# Patient Record
Sex: MALE | Race: WHITE | NOT HISPANIC OR LATINO | Employment: FULL TIME | URBAN - METROPOLITAN AREA
[De-identification: names, ages, dates, MRNs, and addresses within clinical notes are randomized per-mention and may not be internally consistent; named-entity substitution may affect disease eponyms.]

---

## 2017-01-06 ENCOUNTER — TELEPHONE (OUTPATIENT)
Dept: FAMILY MEDICINE | Facility: CLINIC | Age: 37
End: 2017-01-06

## 2017-01-06 DIAGNOSIS — Z20.818 EXPOSURE TO PERTUSSIS: Primary | ICD-10-CM

## 2017-01-06 RX ORDER — AZITHROMYCIN 250 MG/1
TABLET, FILM COATED ORAL
Qty: 6 TABLET | Refills: 0 | Status: SHIPPED | OUTPATIENT
Start: 2017-01-06 | End: 2017-01-12

## 2017-01-11 ENCOUNTER — TELEPHONE (OUTPATIENT)
Dept: FAMILY MEDICINE | Facility: CLINIC | Age: 37
End: 2017-01-11

## 2017-01-11 DIAGNOSIS — Z30.09 ENCOUNTER FOR VASECTOMY COUNSELING: Primary | ICD-10-CM

## 2017-01-11 RX ORDER — DIAZEPAM 5 MG
TABLET ORAL
Qty: 3 TABLET | Refills: 0 | Status: SHIPPED | OUTPATIENT
Start: 2017-01-11 | End: 2017-12-10

## 2017-01-12 ENCOUNTER — TELEPHONE (OUTPATIENT)
Dept: FAMILY MEDICINE | Facility: CLINIC | Age: 37
End: 2017-01-12

## 2017-01-12 ENCOUNTER — OFFICE VISIT (OUTPATIENT)
Dept: FAMILY MEDICINE | Facility: CLINIC | Age: 37
End: 2017-01-12
Payer: COMMERCIAL

## 2017-01-12 VITALS
RESPIRATION RATE: 16 BRPM | BODY MASS INDEX: 19.92 KG/M2 | HEART RATE: 100 BPM | SYSTOLIC BLOOD PRESSURE: 122 MMHG | TEMPERATURE: 98.5 F | OXYGEN SATURATION: 98 % | HEIGHT: 72 IN | DIASTOLIC BLOOD PRESSURE: 80 MMHG | WEIGHT: 147.1 LBS

## 2017-01-12 DIAGNOSIS — Z30.2 ENCOUNTER FOR STERILIZATION: Primary | ICD-10-CM

## 2017-01-12 PROCEDURE — 88302 TISSUE EXAM BY PATHOLOGIST: CPT | Mod: 26 | Performed by: FAMILY MEDICINE

## 2017-01-12 PROCEDURE — 88302 TISSUE EXAM BY PATHOLOGIST: CPT | Performed by: FAMILY MEDICINE

## 2017-01-12 PROCEDURE — 55250 REMOVAL OF SPERM DUCT(S): CPT | Performed by: FAMILY MEDICINE

## 2017-01-12 RX ORDER — HYDROCODONE BITARTRATE AND ACETAMINOPHEN 5; 325 MG/1; MG/1
1 TABLET ORAL EVERY 8 HOURS PRN
Qty: 12 TABLET | Refills: 0 | Status: SHIPPED | OUTPATIENT
Start: 2017-01-12 | End: 2017-12-10

## 2017-01-12 ASSESSMENT — PAIN SCALES - GENERAL: PAINLEVEL: NO PAIN (0)

## 2017-01-12 NOTE — TELEPHONE ENCOUNTER
Patient is calling stating that [facility] hasn't received his script. Wondering if he can get it filled here and pick it up from to his procedure? Please call him back

## 2017-01-12 NOTE — TELEPHONE ENCOUNTER
Written prescription was faxed to [facility] pharmacy in [place].  Jacki Wu CMA

## 2017-01-12 NOTE — PROGRESS NOTES
SUBJECTIVE:                                                    Barron Angel is a 36 year old male who presents to clinic today for the following health issues:    Concern - Procedure     Onset: Vasectomy     Barron is here for the vasectomy procedure.  Pre-procedure consultation was done last month and please see my dictation for further details. Information about vasectomy was given and he has no concern or questions about the procedure today. Again per patient, both he and his wife are ready to have this procedure done and they are for sure that they do want to have any more children in the future. They have 6 children together.  He denies of URI symptoms including runny nose, nasal congestions or coughing. No abdominal pain, nausea or vomiting. No diarrhea or constipation. No problems with urination.        Problem list and histories reviewed & adjusted, as indicated.  Additional history: as documented          ROS:  Constitutional, HEENT, cardiovascular, pulmonary, gi and gu systems are negative, except as otherwise noted.      OBJECTIVE:                                                      Vitals: /80 mmHg  Pulse 100  Temp(Src) 98.5  F (36.9  C) (Temporal)  Resp 16  Ht 6' (1.829 m)  Wt 147 lb 1.6 oz (66.724 kg)  BMI 19.95 kg/m2  SpO2 98%      GENERAL: healthy, alert and no distress   (male): normal male genitalia without lesions or urethral discharge, no hernia.  Testes are descended bilaterally with no testicular mass. Scrotal skin looks normal. The vas deferens were easily by palpated bilaterally.    Diagnostic Test Results:  none         ASSESSMENT/PLAN:                                                      1. Encounter for vasectomy  Patient was again reassured that vasectomy is considered a permenant and irrevirsible birth control method.  Per patient, both he and his wife are very sure that this is what they want.  No contraindication was noted. Will perform the vasectomy and please  see the procedure note for further details.  Post procedure care and wound care discussed. He was educated about symptoms to need to be seen or call in.  He was again informed that he is not declared to be sterile until he has two negative sperm test consecutively and therefore he should continue with the current form of birth control.  Sperm tests to be conducted in 4 weeks times 2.  Tylenol or Motrin as needed for pain and Vicodin as needed for severe pain. All of his questions were answered.      ICD-10-CM    1. Encounter for sterilization Z30.2 SURGICAL PATHOLOGY EXAM     Semen Post Vasectomy Qual (MG, [place], [place])     HYDROcodone-acetaminophen (NORCO) 5-325 MG per tablet       Laisha Bryan MD  [facility] [place]

## 2017-01-12 NOTE — NURSING NOTE
Chief Complaint   Patient presents with     Procedure     vasectomy       Initial /80 mmHg  Pulse 100  Temp(Src) 98.5  F (36.9  C) (Temporal)  Resp 16  Ht 6' (1.829 m)  Wt 147 lb 1.6 oz (66.724 kg)  BMI 19.95 kg/m2  SpO2 98% Estimated body mass index is 19.95 kg/(m^2) as calculated from the following:    Height as of this encounter: 6' (1.829 m).    Weight as of this encounter: 147 lb 1.6 oz (66.724 kg).  BP completed using cuff size: regular    Health Maintenance Due   Topic Date Due     LIPID SCREEN Q5 YR MALE (SYSTEM ASSIGNED)  02/15/2016     Jacki Wu, CMA

## 2017-01-12 NOTE — LETTER
[address]   [address]  Tel. [phone]/ Fax [phone]    March 16, 2018        Barronkevin LazcanoAngel  [address] [place] [id number]          Dear Mr. Barron Angel:    Your previous orders for lab work (post vasectomy semen analysis) have been extended.  Please call to schedule a lab appointment and return to the clinic to have the labs drawn at your earliest convenience.    If you are required to be fasting for these tests,  remember to have nothing by mouth (except water) after midnight on the night before your test.    If you have any questions or concerns, please contact our office.    Sincerely,       Laisha Bryan M.D.

## 2017-01-12 NOTE — TELEPHONE ENCOUNTER
I have a written prescription ready for patient for his procedure today.  I need to know where he wants it or if he wants to pick it up so he can take the medication for his procedure.    Jacki Wu, CMA

## 2017-01-12 NOTE — TELEPHONE ENCOUNTER
Pt called back. Relayed message below to pt. Pt states to send the medication to [facility] pharmacy in [place], [place].    Thank you,   Kelsey Hart   for [facility]

## 2017-01-12 NOTE — TELEPHONE ENCOUNTER
Patient was informed that I will bring the written prescription to the [facility] as requested.  Jacki Wu, CMA

## 2017-01-15 NOTE — PROCEDURES
Indications:  Voluntarily sterilization.  Both patient and his wife are desirous for permanent, irreversible birth control method and request to have the Vasectomy done today.      Consent:  The whole procdure was again discussed with patient in details.  Associated risks and side effects were also discussed, which include but not limitted to pain, infection, bleeding, hematoma and rare cases he may have prolonged pain that could last up to 6 -9 months.  Also reasure him of a minute chance of failed vasectomy.  He was reminded that he is not consider to be steriled until he has at least 2 consecutive negative sperm tests.  All of his questions were answered and the consent obtained.      Time out performed and patient was identified times two.  The name of the procedure and the site of the procedure to be done was also identified.      Procedure:  He was give 10 mg of valium before the procedure and he tolerated that well.  The whole procedure was done in a usual sterile manaer.  The scrotal area was saved by the patient at home - no skin abrasion noted.  The scrotum was then cleaned with iodine times two.  Lidocain 1% without epi was localy injected at the median raphe, about 2 cm inferior to the base of the penis and about 1 cc was used.  About 3 cc of same Lidocain was injected along the vas deferens bilaterally.  He tolerated that well and has good anesthetic effect.  A dissecting scissors was used to dissect an approximate 0.5 cm incision at the median raphe where the lidocain was injected.  The left vas deferens was palpated and grasped with the three fingers technique.  The ring clamp was used to grasp the vas deferens and the dissecting scissor was used to dissect off all the tissues along the vas deferen.  The vas was identified and the testicular end was tied up with 3.0 Vicryl. The prostatic end was nicked with a #15 blade and then cauterized internally.  It then tied with 3.0 Vicryl.  The midportion of  the vas deferens between the silk sutures was ligated and sent to pathology.  Cauterization was also performed to the vas deferens on both sides.  Fenstration was performed on the prostatic end of the as deferens.  The vas deferens was placed back in the scrotum .  Good hemostasis and he tolerated the procedure well.      Identical procedure was done on the right vas deferen without any difficulty.  Again, good hemostasis was noted.      He overall tolerated the procedure well.  Post procedure care initiated.  Post procedure care instruction was explained and discussed in details.  Emphrazsize he importance of using back up methods until he gets 2 consecutive negative sperm tests.  Tylenol or Motrin as needed for pain.  Twelve tablets of vicodin were given for severe pain.  Educate patient and his wife about wound care and symptoms that need to be seen or call in.  They feel comfortable with the plan and all of their questions were answered.

## 2017-01-16 LAB — COPATH REPORT: NORMAL

## 2017-01-17 ENCOUNTER — TELEPHONE (OUTPATIENT)
Dept: FAMILY MEDICINE | Facility: CLINIC | Age: 37
End: 2017-01-17

## 2017-01-17 NOTE — TELEPHONE ENCOUNTER
Tried to reach patient, left message for patient to call the clinic back.  Jacki Wu, [facility]

## 2017-01-17 NOTE — TELEPHONE ENCOUNTER
Pt returned call, message per Dr Bryan was relayed to pt, no questions at this time.  Thank you,  Hilda Kay  Patient Representative

## 2017-01-17 NOTE — TELEPHONE ENCOUNTER
Notes Recorded by Laisha Bryan MD on 1/17/2017 at 6:53 AM  Please let patient know that the pathology showed both of his vas deferens was correctly removed. Thank you

## 2017-12-10 ENCOUNTER — HOSPITAL ENCOUNTER (EMERGENCY)
Facility: CLINIC | Age: 37
Discharge: HOME | End: 2017-12-10
Attending: FAMILY MEDICINE | Admitting: FAMILY MEDICINE
Payer: COMMERCIAL

## 2017-12-10 VITALS
WEIGHT: 151.6 LBS | RESPIRATION RATE: 20 BRPM | SYSTOLIC BLOOD PRESSURE: 146 MMHG | DIASTOLIC BLOOD PRESSURE: 88 MMHG | TEMPERATURE: 97.8 F | OXYGEN SATURATION: 99 % | BODY MASS INDEX: 20.56 KG/M2 | HEART RATE: 61 BPM

## 2017-12-10 DIAGNOSIS — S13.9XXA SPRAIN OF NECK, INITIAL ENCOUNTER: ICD-10-CM

## 2017-12-10 PROCEDURE — 25000132 ZZH RX MED GY IP 250 OP 250 PS 637: Performed by: FAMILY MEDICINE

## 2017-12-10 PROCEDURE — 99284 EMERGENCY DEPT VISIT MOD MDM: CPT | Mod: Z6 | Performed by: FAMILY MEDICINE

## 2017-12-10 PROCEDURE — 99283 EMERGENCY DEPT VISIT LOW MDM: CPT | Performed by: FAMILY MEDICINE

## 2017-12-10 RX ORDER — CYCLOBENZAPRINE HCL 10 MG
5-10 TABLET ORAL 3 TIMES DAILY PRN
Qty: 21 TABLET | Refills: 0 | Status: SHIPPED | OUTPATIENT
Start: 2017-12-10 | End: 2017-12-17

## 2017-12-10 RX ORDER — IPRATROPIUM BROMIDE AND ALBUTEROL SULFATE 2.5; .5 MG/3ML; MG/3ML
SOLUTION RESPIRATORY (INHALATION)
Status: DISCONTINUED
Start: 2017-12-10 | End: 2017-12-10 | Stop reason: HOSPADM

## 2017-12-10 RX ORDER — LIDOCAINE 50 MG/G
0.5 PATCH TOPICAL ONCE
Status: DISCONTINUED | OUTPATIENT
Start: 2017-12-10 | End: 2017-12-10 | Stop reason: HOSPADM

## 2017-12-10 RX ADMIN — LIDOCAINE 0.5 PATCH: 50 PATCH CUTANEOUS at 19:52

## 2017-12-10 NOTE — ED AVS SNAPSHOT
[facility] Emergency Department    [address] [place] [address]    Phone:  [phone]    Fax:  [phone]                                       Barron Angel   MRN: [id number]    Department:  [facility] Emergency Department   Date of Visit:  12/10/2017           After Visit Summary Signature Page     I have received my discharge instructions, and my questions have been answered. I have discussed any challenges I see with this plan with the nurse or doctor.    ..........................................................................................................................................  Patient/Patient Representative Signature      ..........................................................................................................................................  Patient Representative Print Name and Relationship to Patient    ..................................................               ................................................  Date                                            Time    ..........................................................................................................................................  Reviewed by Signature/Title    ...................................................              ..............................................  Date                                                            Time

## 2017-12-10 NOTE — ED AVS SNAPSHOT
[facility] Emergency Department    [address] [place] [address]    Phone:  [phone]    Fax:  [phone]                                       Barron Angel   MRN: [id number]    Department:  [facility] Emergency Department   Date of Visit:  12/10/2017           Patient Information     Date Of Birth          1980        Your diagnoses for this visit were:     Sprain of left neck        You were seen by Clarisa Balderas MD.      Follow-up Information     Follow up with Monik Hemphill MD In 3 days.    Specialty:  Family Practice    Contact information:    [address] [place] [id number] [phone]          Follow up with [facility] Emergency Department.    Specialty:  EMERGENCY MEDICINE    Why:  If symptoms worsen    Contact information:    [address] [place] [id number] [phone]    Additional information:    From [address] beds at the [facility] [address]: Exit at [address] on south side of [place]. Turn right on [address]. Turn left at stoplight on [address]. [facility] will be in view two blocks ahead        Discharge Instructions       Thank you for giving us the opportunity to see you. The impression is that you have myofascial neck pain from a sprain/strain to the left neck muscles.    Take ibuprofen 600 mg 3 times a day as needed for pain.  Add Flexeril 1-2 tablet 3 times a day for spasms.    Leave the Lidoderm patch on for 12 hours, and then remove it.  If it helps he can buy over-the-counter lidocaine patches and use as directed.    If you are not seeing an improvement within 3-5 days, please follow up with your primary care provider or clinic.     After discharge, please closely monitor for any new or worsening symptoms. Return to the Emergency Department at any time if your symptoms worsen.        24 Hour Appointment Hotline       To make an appointment at any [facility] clinic, call [phone] ([phone]).  If you don't have a family doctor or clinic, we will help you find one. [facility] clinics are conveniently located to serve the needs of you and your family.             Review of your medicines      START taking        Dose / Directions Last dose taken    cyclobenzaprine 10 MG tablet   Commonly known as:  FLEXERIL   Dose:  5-10 mg   Quantity:  21 tablet        Take 0.5-1 tablets (5-10 mg) by mouth 3 times daily as needed for muscle spasms   Refills:  0                Prescriptions were sent or printed at these locations (1 Prescription)                   [facility]   [address] [address]    Telephone:  [phone]   Fax:  [phone]   Hours:                  These medications are not ready yet because we are checking if your insurance will help you pay for them. Call your pharmacy to confirm that your medication is ready for pickup. It may take up to 24 hours for them to receive the prescription. If the prescription is not ready within 3 business days, please contact your clinic or your provider (1 of 1)         cyclobenzaprine (FLEXERIL) 10 MG tablet                Orders Needing Specimen Collection     None      Pending Results     No orders found from 12/8/2017 to 12/11/2017.            Pending Culture Results     No orders found from 12/8/2017 to 12/11/2017.            Pending Results Instructions     If you had any lab results that were not finalized at the time of your Discharge, you can call the ED Lab Result RN at [phone]. You will be contacted by this team for any positive Lab results or changes in treatment. The nurses are available 7 days a week from 10A to 6:30P.  You can leave a message 24 hours per day and they will return your call.        Thank you for choosing [facility]       Thank you for choosing [facility] for your care. Our goal is always to provide you with excellent care. Hearing back from our patients is one way we can continue to improve  "our services. Please take a few minutes to complete the written survey that you may receive in the mail after you visit with us. Thank you!        [employer] Information     [employer] lets you send messages to your doctor, view your test results, renew your prescriptions, schedule appointments and more. To sign up, go to [email or web address] . Click on \"Log in\" on the left side of the screen, which will take you to the Welcome page. Then click on \"Sign up Now\" on the right side of the page.     You will be asked to enter the access code listed below, as well as some personal information. Please follow the directions to create your username and password.     Your access code is: [email or web address]  Expires: 3/10/2018  7:45 PM     Your access code will  in 90 days. If you need help or a new code, please call your [place] clinic or [phone].        Care EveryWhere ID     This is your Care EveryWhere ID. This could be used by other organizations to access your [place] medical records  [id number]        Equal Access to Services     [facility]: Hadii caleb Ellis, wagarrickda niyah, qaybmaggie quezadaalsophia traore, brenden navas. So [facility] [phone].    ATENCIÓN: Si habla español, tiene a august disposición servicios gratuitos de asistencia lingüística. Daniel al [phone].    We comply with applicable federal civil rights laws and [place] laws. We do not discriminate on the basis of race, color, national origin, age, disability, sex, sexual orientation, or gender identity.            After Visit Summary       This is your record. Keep this with you and show to your community pharmacist(s) and doctor(s) at your next visit.                  "

## 2017-12-11 NOTE — DISCHARGE INSTRUCTIONS
Thank you for giving us the opportunity to see you. The impression is that you have myofascial neck pain from a sprain/strain to the left neck muscles.    Take ibuprofen 600 mg 3 times a day as needed for pain.  Add Flexeril 1-2 tablet 3 times a day for spasms.    Leave the Lidoderm patch on for 12 hours, and then remove it.  If it helps he can buy over-the-counter lidocaine patches and use as directed.    If you are not seeing an improvement within 3-5 days, please follow up with your primary care provider or clinic.     After discharge, please closely monitor for any new or worsening symptoms. Return to the Emergency Department at any time if your symptoms worsen.

## 2017-12-11 NOTE — ED PROVIDER NOTES
[facility] ED Provider Note   CC:     Chief Complaint   Patient presents with     Neck Pain     HPI:  Barron Angel is a 37 year old male who presented to the emergency department for evaluation of left-sided neck pain. Patient tried looking to his left this morning around 0900 when he couldn't without pain. The pain is described as sharp with movement, but dull when the neck is straight. The pain radiates across his shoulder blades but does not radiate down the arms.  Pain level is minimal at rest, and with movement to the left, pain is up to 7/10.  Has tried ice, heat and Biofreeze with no relief. Denies numbness, tingling or weakness in extremities. No difficulty swallowing, vision changes or tinnitus.  Patient denies any significant past medical history such as diabetes, hypertension or hyperlipidemia.  He is a smoker of a quarter to half a pack of cigarettes per day.    Problem List:  Patient Active Problem List    Diagnosis     CARDIOVASCULAR SCREENING; LDL GOAL LESS THAN 160       MEDS:   Previous Medications    No medications on file       ALLERGIES:  No Known Allergies    Past Surgical History:   Procedure Laterality Date     C TIBIA FX.BRACE;SEMIRIGID  2004    Fractured right tibia, fixator gregory     HC REMOVE FOREIGN BODY SIMPLE  01/10/06    Right hand     LAP VENTRAL HERNIA REPAIR  8/19/2010    open procedure       Social History   Substance Use Topics     Smoking status: Current Every Day Smoker     Packs/day: 0.50     Years: 20.00     Types: Cigarettes     Smokeless tobacco: Former User      Comment: started age 16     Alcohol use 0.0 oz/week     0 Standard drinks or equivalent per week      Comment: none since 2011, seldom         Review of Systems   Except as noted in HPI, all other systems were reviewed and are negative    Physical Exam     Vitals were reviewed  Patient Vitals for the past 8 hrs:   BP Temp Temp src Pulse Resp SpO2  Weight   12/10/17 1906 146/88 97.8  F (36.6  C) Oral 61 20 99 % 68.8 kg (151 lb 9.6 oz)     GENERAL APPEARANCE: calm, in no acute distress, alert male  FACE: normal facies  EYES: Pupils are equal  HENT: normal external exam  NECK: no adenopathy or asymmetry; mild left-sided neck pain.  Patient has restricted leftward rotation due to pain, and limited extension at the neck.  RESP: normal respiratory effort; clear breath sounds bilaterally  CV: regular rate and rhythm; no significant murmurs, gallops or rubs  ABD: soft, no tenderness; no rebound or guarding; bowel sounds are normal  MS: no gross deformities noted; normal muscle tone.  EXT: No calf tenderness or pitting edema; full range of motion in the shoulders.  Good handgrip  SKIN: no worrisome rash  NEURO: no facial droop; no focal deficits, speech is normal; no weakness.        Available Lab/Imaging Results   No results found for this or any previous visit (from the past 24 hour(s)).      Impression     Final diagnoses:   Sprain of left neck       ED Course & Medical Decision Making   Barron Angel is a 37 year old male who presented to the emergency department with acute myofascial left-sided neck pain that started at 9:00 this morning as he was turning to the left.  Since then he has had difficulty with turning that way due to pain.  He does not have any radicular symptoms and particularly does not have any weakness, numbness, tingling.  He does not have any anterior neck pain and has no visual disturbance, problems with chewing, or facial numbness.  Patient likely has a neck sprain with myofascial spasms.  I offered trigger point injections, and the patient declined.  Begin Flexeril 5-10 mg 3 times a day as needed for spasms.  We applied a part of a Lidoderm patch, and he can use over-the-counter lidocaine patches if that helps.  Recheck in the clinic in 3-5 days if not improving.      Written after-visit summary and instructions were given at the time of  discharge.      New Prescriptions    CYCLOBENZAPRINE (FLEXERIL) 10 MG TABLET    Take 0.5-1 tablets (5-10 mg) by mouth 3 times daily as needed for muscle spasms     This document serves as a record of services personally performed by Clarisa Balderas MD. It was created on their behalf by Samara Tadeo, a trained medical scribe. The creation of this record is based on the provider's personal observations and the statements of the patient. This document has been checked and approved by the attending provider.        This note was completed in part using Dragon voice recognition, and may contain word and grammatical errors.        Clarisa Balderas MD  12/10/17 1947

## 2018-01-01 ENCOUNTER — HOSPITAL ENCOUNTER (EMERGENCY)
Facility: CLINIC | Age: 38
Discharge: HOME | End: 2018-01-01
Attending: NURSE PRACTITIONER | Admitting: NURSE PRACTITIONER
Payer: COMMERCIAL

## 2018-01-01 VITALS
BODY MASS INDEX: 21.02 KG/M2 | OXYGEN SATURATION: 99 % | SYSTOLIC BLOOD PRESSURE: 129 MMHG | TEMPERATURE: 98.9 F | DIASTOLIC BLOOD PRESSURE: 89 MMHG | RESPIRATION RATE: 16 BRPM | WEIGHT: 155 LBS

## 2018-01-01 DIAGNOSIS — L03.011 CELLULITIS OF FINGER OF RIGHT HAND: ICD-10-CM

## 2018-01-01 DIAGNOSIS — S60.459A FOREIGN BODY IN SKIN OF FINGER, INITIAL ENCOUNTER: ICD-10-CM

## 2018-01-01 PROCEDURE — 10120 INC&RMVL FB SUBQ TISS SMPL: CPT | Mod: Z6 | Performed by: NURSE PRACTITIONER

## 2018-01-01 PROCEDURE — 99284 EMERGENCY DEPT VISIT MOD MDM: CPT | Mod: 25 | Performed by: NURSE PRACTITIONER

## 2018-01-01 PROCEDURE — 10120 INC&RMVL FB SUBQ TISS SMPL: CPT | Performed by: NURSE PRACTITIONER

## 2018-01-01 PROCEDURE — 99283 EMERGENCY DEPT VISIT LOW MDM: CPT | Mod: 25 | Performed by: NURSE PRACTITIONER

## 2018-01-01 PROCEDURE — 25000125 ZZHC RX 250: Performed by: NURSE PRACTITIONER

## 2018-01-01 RX ORDER — LIDOCAINE HYDROCHLORIDE 10 MG/ML
5 INJECTION, SOLUTION INFILTRATION; PERINEURAL ONCE
Status: COMPLETED | OUTPATIENT
Start: 2018-01-01 | End: 2018-01-01

## 2018-01-01 RX ADMIN — LIDOCAINE HYDROCHLORIDE 5 ML: 10 INJECTION, SOLUTION INFILTRATION; PERINEURAL at 18:50

## 2018-01-01 NOTE — ED AVS SNAPSHOT
[facility] Emergency Department    [address]     [place] [address]    Phone:  [phone]    Fax:  [phone]                                       Barron Angel   MRN: [id number]    Department:  [facility] Emergency Department   Date of Visit:  1/1/2018           Patient Information     Date Of Birth          1980        Your diagnoses for this visit were:     Foreign body in skin of finger, initial encounter     Cellulitis of finger of right hand        You were seen by Debra Villasenor APRN CNP.      Follow-up Information     Follow up with Monik Hemphill MD In 1 week.    Specialty:  Family Practice    Contact information:    [address] [place] [id number] [phone]          Follow up with [facility] Emergency [facility].    Specialty:  EMERGENCY MEDICINE    Why:  If symptoms worsen    Contact information:    [address]   [place] [address] [phone]    Additional information:    From [address] [address]: Exit at [address] on south side of [place]. Turn right on [address]. Turn left at stoplight on [address]. [facility] will be in view two blocks ahead        Discharge Instructions         Cellulitis  Cellulitis is an infection of the deep layers of skin. A break in the skin, such as a cut or scratch, can let bacteria under the skin. If the bacteria get to deep layers of the skin, it can be serious. If not treated, cellulitis can get into the bloodstream and lymph nodes. The infection can then spread throughout the body. This causes serious illness.  Cellulitis causes the affected skin to become red, swollen, warm, and sore. The reddened areas have a visible border. An open sore may leak fluid (pus). You may have a fever, chills, and pain.  Cellulitis is treated with antibiotics taken for 7 to 10 days. An open sore may be cleaned and covered with cool wet gauze. Symptoms should get better 1 to 2  days after treatment is started. Make sure to take all the antibiotics for the full number of days until they are gone. Keep taking the medicine even if your symptoms go away.  Home care  Follow these tips:    Limit the use of the part of your body with cellulitis.     If the infection is on your leg, keep your leg raised while sitting. This will help to reduce swelling.    Take all of the antibiotic medicine exactly as directed until it is gone. Do not miss any doses, especially during the first 7 days. Don t stop taking the medicine when your symptoms get better.    Keep the affected area clean and dry.    Wash your hands with soap and warm water before and after touching your skin. Anyone else who touches your skin should also wash his or her hands. Don't share towels.  Follow-up care  Follow up with your healthcare provider, or as advised. If your infection does not go away on the first antibiotic, your healthcare provider will prescribe a different one.  When to seek medical advice  Call your healthcare provider right away if any of these occur:    Red areas that spread    Swelling or pain that gets worse    Fluid leaking from the skin (pus)    Fever higher of 100.4  F (38.0  C) or higher after 2 days on antibiotics  Date Last Reviewed: 9/1/2016 2000-2017 [employer]. [address] [place] [address], [place] [id number]. All rights reserved. This information is not intended as a substitute for professional medical care. Always follow your healthcare professional's instructions.          Foreign Object Under the Skin (Removed)  An object has been removed from under your skin. Although care was taken to remove all of it, there is always a chance that a small piece may have been left behind.  Most skin wounds heal without problems. But there can be an increased risk of infection if anything stays under the skin. Sometimes the pieces work their way out on their own, and sometimes they can cause an infection.  Very small pieces that stay under the skin usually don t cause a problem or need further treatment.  Home care  Wound care    Keep the wound clean and dry.    If there is a dressing or bandage, change it when it gets wet or dirty. Otherwise, leave it on for the first 24 hours, then change it once a day or as often as you were instructed.    If stitches or staples were used, clean the wound every day:    After taking off the dressing, wash the area gently with soap and water.    Apply a thin layer of antibiotic ointment to the cut. This will keep the wound clean and make it easier to remove the stitches. If it is oozing a lot, you can put a nonstick dressing over it. Then reapply the bandage or dressing as you were instructed.    You can get it wet, just like when you clean it. This means you can shower as usual for the first 24 hours, but do not soak the area in water (no baths or swimming) until the stitches or staples are taken out.    If surgical tape or strips were used, keep the area clean and dry. If it becomes wet, blot it dry with a towel.    Medicine    You can take over-the-counter medicine for pain, unless you were given a different pain medicine to use. If you have chronic liver or kidney disease or ever had a stomach ulcer, or gastrointestinal bleeding or are taking blood thinner medicines, talk with your healthcare provider before using these medicines.    If you were given antibiotics, take them until they are used up. It is important to finish the antibiotics even if the wound looks better to make sure the infection clears.  Follow-up care  Follow up your healthcare provider, or as advised. Keep in mind the following:    Watch for any signs of infection, such as increasing pain, redness, swelling, or pus drainage. If this happens, don t wait for your scheduled visit, rather see your healthcare provider sooner.    Stitches or staples are usually taken out within 5 to 14 days. This varies depending on  what part of your body they are on, and the type of wound. The healthcare provider will tell you how long they should be left in.    If surgical tape or strips were used, they are usually left on for 7 to 10 days. You can remove them after that unless you were told otherwise. If you try to remove them, and it is too difficult, soaking can help. If the edges of the cut pull apart, then stop removing the tape, and follow up with your healthcare provider.    If X-rays were taken, you will be told if there are new findings that may affect your care.  When to seek medical care  Call your healthcare provider right away if any of these occur:    Fever of 100.4 F (38 C) or higher, or as directed by your healthcare provider    Increasing pain in the wound    Redness, swelling or pus coming from the wound  Date Last Reviewed: 10/1/2016    0825-9519 [employer]. [address] [place] [address]. All rights reserved. This information is not intended as a substitute for professional medical care. Always follow your healthcare professional's instructions.          24 Hour Appointment Hotline       To make an appointment at any [facility], call [phone] ([phone]). If you don't have a family doctor or clinic, we will help you find one. [facility] clinics are conveniently located to serve the needs of you and your family.             Review of your medicines      START taking        Dose / Directions Last dose taken    amoxicillin-clavulanate 875-125 MG per tablet   Commonly known as:  AUGMENTIN   Dose:  1 tablet   Quantity:  20 tablet        Take 1 tablet by mouth 2 times daily for 10 days   Refills:  0                Prescriptions were sent or printed at these locations (1 Prescription)                   [facility]   [address] [address]    Telephone:  [phone]   Fax:  [phone]   Hours:                  These medications are not ready  "yet because we are checking if your insurance will help you pay for them. Call your pharmacy to confirm that your medication is ready for pickup. It may take up to 24 hours for them to receive the prescription. If the prescription is not ready within 3 business days, please contact your clinic or your provider (1 of 1)         amoxicillin-clavulanate (AUGMENTIN) 875-125 MG per tablet                Orders Needing Specimen Collection     None      Pending Results     No orders found from 12/30/2017 to 1/2/2018.            Pending Culture Results     No orders found from 12/30/2017 to 1/2/2018.            Pending Results Instructions     If you had any lab results that were not finalized at the time of your Discharge, you can call the ED Lab Result RN at [phone]. You will be contacted by this team for any positive Lab results or changes in treatment. The nurses are available 7 days a week from 10A to 6:30P.  You can leave a message 24 hours per day and they will return your call.        Thank you for choosing [facility]       Thank you for choosing [facility] for your care. Our goal is always to provide you with excellent care. Hearing back from our patients is one way we can continue to improve our services. Please take a few minutes to complete the written survey that you may receive in the mail after you visit with us. Thank you!        [employer] Information     [employer] lets you send messages to your doctor, view your test results, renew your prescriptions, schedule appointments and more. To sign up, go to [email or web address] . Click on \"Log in\" on the left side of the screen, which will take you to the Welcome page. Then click on \"Sign up Now\" on the right side of the page.     You will be asked to enter the access code listed below, as well as some personal information. Please follow the directions to create your username and password.     Your access code is: [email or web address]  Expires: 3/10/2018  7:45 PM     Your " access code will  in 90 days. If you need help or a new code, please call your [place] clinic or [phone].        Care EveryWhere ID     This is your Care EveryWhere ID. This could be used by other organizations to access your [place] medical records  [id number]        Equal Access to Services     UMESH GONZALEZ : Hadii aad ku hadasho Sosindyali, waaxda luqadaha, qaybta kaalmada león, brenden navas. So [facility] [phone].    ATENCIÓN: Si habla español, tiene a august disposición servicios gratuitos de asistencia lingüística. Llame al [phone].    We comply with applicable federal civil rights laws and [place] laws. We do not discriminate on the basis of race, color, national origin, age, disability, sex, sexual orientation, or gender identity.            After Visit Summary       This is your record. Keep this with you and show to your community pharmacist(s) and doctor(s) at your next visit.

## 2018-01-01 NOTE — ED AVS SNAPSHOT
[facility] Emergency Department    [address] [place] [address]    Phone:  [phone]    Fax:  [phone]                                       Barron Angel   MRN: [id number]    Department:  [facility] Emergency Department   Date of Visit:  1/1/2018           After Visit Summary Signature Page     I have received my discharge instructions, and my questions have been answered. I have discussed any challenges I see with this plan with the nurse or doctor.    ..........................................................................................................................................  Patient/Patient Representative Signature      ..........................................................................................................................................  Patient Representative Print Name and Relationship to Patient    ..................................................               ................................................  Date                                            Time    ..........................................................................................................................................  Reviewed by Signature/Title    ...................................................              ..............................................  Date                                                            Time

## 2018-01-02 ASSESSMENT — ENCOUNTER SYMPTOMS
WOUND: 1
COLOR CHANGE: 1

## 2018-01-02 NOTE — DISCHARGE INSTRUCTIONS
Cellulitis  Cellulitis is an infection of the deep layers of skin. A break in the skin, such as a cut or scratch, can let bacteria under the skin. If the bacteria get to deep layers of the skin, it can be serious. If not treated, cellulitis can get into the bloodstream and lymph nodes. The infection can then spread throughout the body. This causes serious illness.  Cellulitis causes the affected skin to become red, swollen, warm, and sore. The reddened areas have a visible border. An open sore may leak fluid (pus). You may have a fever, chills, and pain.  Cellulitis is treated with antibiotics taken for 7 to 10 days. An open sore may be cleaned and covered with cool wet gauze. Symptoms should get better 1 to 2 days after treatment is started. Make sure to take all the antibiotics for the full number of days until they are gone. Keep taking the medicine even if your symptoms go away.  Home care  Follow these tips:    Limit the use of the part of your body with cellulitis.     If the infection is on your leg, keep your leg raised while sitting. This will help to reduce swelling.    Take all of the antibiotic medicine exactly as directed until it is gone. Do not miss any doses, especially during the first 7 days. Don t stop taking the medicine when your symptoms get better.    Keep the affected area clean and dry.    Wash your hands with soap and warm water before and after touching your skin. Anyone else who touches your skin should also wash his or her hands. Don't share towels.  Follow-up care  Follow up with your healthcare provider, or as advised. If your infection does not go away on the first antibiotic, your healthcare provider will prescribe a different one.  When to seek medical advice  Call your healthcare provider right away if any of these occur:    Red areas that spread    Swelling or pain that gets worse    Fluid leaking from the skin (pus)    Fever higher of 100.4  F (38.0  C) or higher after 2 days  on antibiotics  Date Last Reviewed: 9/1/2016 2000-2017 [employer]. [address] [place] [address], [place] [address]. All rights reserved. This information is not intended as a substitute for professional medical care. Always follow your healthcare professional's instructions.          Foreign Object Under the Skin (Removed)  An object has been removed from under your skin. Although care was taken to remove all of it, there is always a chance that a small piece may have been left behind.  Most skin wounds heal without problems. But there can be an increased risk of infection if anything stays under the skin. Sometimes the pieces work their way out on their own, and sometimes they can cause an infection. Very small pieces that stay under the skin usually don t cause a problem or need further treatment.  Home care  Wound care    Keep the wound clean and dry.    If there is a dressing or bandage, change it when it gets wet or dirty. Otherwise, leave it on for the first 24 hours, then change it once a day or as often as you were instructed.    If stitches or staples were used, clean the wound every day:    After taking off the dressing, wash the area gently with soap and water.    Apply a thin layer of antibiotic ointment to the cut. This will keep the wound clean and make it easier to remove the stitches. If it is oozing a lot, you can put a nonstick dressing over it. Then reapply the bandage or dressing as you were instructed.    You can get it wet, just like when you clean it. This means you can shower as usual for the first 24 hours, but do not soak the area in water (no baths or swimming) until the stitches or staples are taken out.    If surgical tape or strips were used, keep the area clean and dry. If it becomes wet, blot it dry with a towel.    Medicine    You can take over-the-counter medicine for pain, unless you were given a different pain medicine to use. If you have chronic liver or kidney  disease or ever had a stomach ulcer, or gastrointestinal bleeding or are taking blood thinner medicines, talk with your healthcare provider before using these medicines.    If you were given antibiotics, take them until they are used up. It is important to finish the antibiotics even if the wound looks better to make sure the infection clears.  Follow-up care  Follow up your healthcare provider, or as advised. Keep in mind the following:    Watch for any signs of infection, such as increasing pain, redness, swelling, or pus drainage. If this happens, don t wait for your scheduled visit, rather see your healthcare provider sooner.    Stitches or staples are usually taken out within 5 to 14 days. This varies depending on what part of your body they are on, and the type of wound. The healthcare provider will tell you how long they should be left in.    If surgical tape or strips were used, they are usually left on for 7 to 10 days. You can remove them after that unless you were told otherwise. If you try to remove them, and it is too difficult, soaking can help. If the edges of the cut pull apart, then stop removing the tape, and follow up with your healthcare provider.    If X-rays were taken, you will be told if there are new findings that may affect your care.  When to seek medical care  Call your healthcare provider right away if any of these occur:    Fever of 100.4 F (38 C) or higher, or as directed by your healthcare provider    Increasing pain in the wound    Redness, swelling or pus coming from the wound  Date Last Reviewed: 10/1/2016    2629-7548 [employer]. [address] [place] [address], [place] [address]. All rights reserved. This information is not intended as a substitute for professional medical care. Always follow your healthcare professional's instructions.

## 2018-01-02 NOTE — ED PROVIDER NOTES
History     Chief Complaint   Patient presents with     Foreign Body in Skin     HPI  Barron Angel is a 37 year old male who presents to the emergency department today with a splinter in his finger that was there a couple days ago, finger is now infected.  Patient reports his tetanus is up-to-date.  He denies any fever, chills, nausea, vomiting.  Patient reports he was able to squeeze pus out of his finger today.  He has not taken any medications for pain.    Problem List:    Patient Active Problem List    Diagnosis Date Noted     CARDIOVASCULAR SCREENING; LDL GOAL LESS THAN 160 10/31/2010     Priority: Medium        Past Medical History:    History reviewed. No pertinent past medical history.    Past Surgical History:    Past Surgical History:   Procedure Laterality Date     C TIBIA FX.BRACE;SEMIRIGID  2004    Fractured right tibia, fixator gregory     HC REMOVE FOREIGN BODY SIMPLE  01/10/06    Right hand     LAP VENTRAL HERNIA REPAIR  8/19/2010    open procedure       Family History:    Family History   Problem Relation Age of Onset     DIABETES Maternal Grandfather      DIABETES Paternal Grandfather        Social History:  Marital Status:  Single [1]  Social History   Substance Use Topics     Smoking status: Current Every Day Smoker     Packs/day: 0.50     Years: 20.00     Types: Cigarettes     Smokeless tobacco: Former User      Comment: started age 16     Alcohol use 0.0 oz/week     0 Standard drinks or equivalent per week      Comment: not very often        Medications:      amoxicillin-clavulanate (AUGMENTIN) 875-125 MG per tablet         Review of Systems   Skin: Positive for color change and wound.   All other systems reviewed and are negative.      Physical Exam   BP: 129/89  Heart Rate: 69  Temp: 98.9  F (37.2  C)  Resp: 16  Weight: 70.3 kg (155 lb)  SpO2: 99 %      Physical Exam   Constitutional: He is oriented to person, place, and time. He appears well-developed and well-nourished. No distress.    HENT:   Head: Normocephalic.   Eyes: Conjunctivae are normal.   Neck: Normal range of motion. Neck supple.   Cardiovascular: Normal rate.    Pulmonary/Chest: Effort normal and breath sounds normal.   Abdominal: Soft. Bowel sounds are normal.   Musculoskeletal: Normal range of motion.   Neurological: He is alert and oriented to person, place, and time.   Skin: Skin is warm and dry. He is not diaphoretic.   Small area of erythema and swelling noted to distal right third digit with purulent drainage present.   Psychiatric: He has a normal mood and affect.       ED Course     ED Course     Procedures    Labs Ordered and Resulted from Time of ED Arrival Up to the Time of Departure from the ED - No data to display    Assessments & Plan (with Medical Decision Making)  Barron is a 37-year-old male who presents to the emergency department today with a splinter that has been in his finger for the last 2 days.  Patient reports that his finger is now swollen and he has squeezed purulent drainage out of his finger.  Patient on exam is well hydrated, he is nontoxic-appearing, he arrives here hemodynamically stable and afebrile.  I injected 1% lidocaine, 2 mL's into patient's right third digit, small incision was made and sliver was removed.  Wound was dressed with bacitracin and gauze and patient was started on Augmentin for cellulitis.  Patient was encouraged to follow-up with primary care later this week, reasons to return to the emergency department were discussed, patient is agreeable to plan of care and was discharged in stable condition.     I have reviewed the nursing notes.    I have reviewed the findings, diagnosis, plan and need for follow up with the patient.      Discharge Medication List as of 1/1/2018  6:45 PM      START taking these medications    Details   amoxicillin-clavulanate (AUGMENTIN) 875-125 MG per tablet Take 1 tablet by mouth 2 times daily for 10 days, Disp-20 tablet, R-0, E-Prescribe             Final  diagnoses:   Foreign body in skin of finger, initial encounter   Cellulitis of finger of right hand       1/1/2018   [facility] EMERGENCY DEPARTMENT     Debra Villasenor, CANDY CNP  01/02/18 0217

## 2018-10-13 ENCOUNTER — HOSPITAL ENCOUNTER (EMERGENCY)
Facility: CLINIC | Age: 38
Discharge: HOME | End: 2018-10-13
Attending: NURSE PRACTITIONER | Admitting: NURSE PRACTITIONER
Payer: COMMERCIAL

## 2018-10-13 VITALS
SYSTOLIC BLOOD PRESSURE: 120 MMHG | OXYGEN SATURATION: 97 % | WEIGHT: 155 LBS | HEART RATE: 95 BPM | RESPIRATION RATE: 16 BRPM | DIASTOLIC BLOOD PRESSURE: 87 MMHG | TEMPERATURE: 98.1 F | BODY MASS INDEX: 21.02 KG/M2

## 2018-10-13 DIAGNOSIS — M54.50 ACUTE MIDLINE LOW BACK PAIN WITHOUT SCIATICA: ICD-10-CM

## 2018-10-13 PROCEDURE — 25000132 ZZH RX MED GY IP 250 OP 250 PS 637: Performed by: NURSE PRACTITIONER

## 2018-10-13 PROCEDURE — 99284 EMERGENCY DEPT VISIT MOD MDM: CPT | Mod: Z6 | Performed by: NURSE PRACTITIONER

## 2018-10-13 PROCEDURE — 99283 EMERGENCY DEPT VISIT LOW MDM: CPT | Performed by: NURSE PRACTITIONER

## 2018-10-13 RX ORDER — LIDOCAINE 4 G/G
2 PATCH TOPICAL ONCE
Status: DISCONTINUED | OUTPATIENT
Start: 2018-10-13 | End: 2018-10-13 | Stop reason: HOSPADM

## 2018-10-13 RX ORDER — HYDROCODONE BITARTRATE AND ACETAMINOPHEN 5; 325 MG/1; MG/1
1-2 TABLET ORAL EVERY 6 HOURS PRN
Qty: 18 TABLET | Refills: 0 | Status: SHIPPED | OUTPATIENT
Start: 2018-10-13 | End: 2021-12-20

## 2018-10-13 RX ADMIN — LIDOCAINE 2 PATCH: 560 PATCH PERCUTANEOUS; TOPICAL; TRANSDERMAL at 19:01

## 2018-10-13 ASSESSMENT — ENCOUNTER SYMPTOMS: BACK PAIN: 1

## 2018-10-13 NOTE — ED AVS SNAPSHOT
[facility] Emergency Department    [address] [place] [address]    Phone:  [phone]    Fax:  [phone]                                       Barron Angel   MRN: [id number]    Department:  [facility] Emergency Department   Date of Visit:  10/13/2018           After Visit Summary Signature Page     I have received my discharge instructions, and my questions have been answered. I have discussed any challenges I see with this plan with the nurse or doctor.    ..........................................................................................................................................  Patient/Patient Representative Signature      ..........................................................................................................................................  Patient Representative Print Name and Relationship to Patient    ..................................................               ................................................  Date                                   Time    ..........................................................................................................................................  Reviewed by Signature/Title    ...................................................              ..............................................  Date                                               Time          22EPIC Rev 08/18

## 2018-10-13 NOTE — DISCHARGE INSTRUCTIONS
Relieving Back Pain  Back pain is a common problem. You can strain back muscles by lifting too much weight or just by moving the wrong way. Back strain can be uncomfortable, even painful. And it can take weeks or months to improve. To help yourself feel better and prevent future back strains, try these tips.  Important Note: Do not give aspirin to children or teens without first discussing it with your healthcare provider.      ? Ice    Ice reduces muscle pain and swelling. It helps most during the first 24 to 48 hours after an injury.    Wrap an ice pack or a bag of frozen peas in a thin towel. (Never place ice directly on your skin.)    Place the ice where your back hurts the most.    Don t ice for more than 20 minutes at a time.    You can use ice several times a day.  ? Medicines  Over-the-counter pain relievers can include acetaminophen and anti-inflammatory medicines, which includes aspirin or ibuprofen. They can help ease discomfort. Some also reduce swelling.    Tell your healthcare provider about any medicines you are already taking.    Take medicines only as directed.  ? Heat  After the first 48 hours, heat can relax sore muscles and improve blood flow.    Try a warm bath or shower. Or use a heating pad set on low. To prevent a burn, keep a cloth between you and the heating pad.    Don t use a heating pad for more than 15 minutes at a time. Never sleep on a heating pad.  Date Last Reviewed: 9/1/2015 2000-2017 [employer]. [address] [place] [address], [place] [address]. All rights reserved. This information is not intended as a substitute for professional medical care. Always follow your healthcare professional's instructions.

## 2018-10-13 NOTE — ED AVS SNAPSHOT
[facility] Emergency Department    [address] [place] [address]    Phone:  [phone]    Fax:  [phone]                                       Barron Angel   MRN: [id number]    Department:  [facility] Emergency Department   Date of Visit:  10/13/2018           Patient Information     Date Of Birth          1980        Your diagnoses for this visit were:     Acute midline low back pain without sciatica Mild lower right back pain as well, no sciatica       You were seen by Debra Villasenor, CANDY CNP.      Follow-up Information     Follow up with Monik Hemphill MD In 1 week.    Specialty:  Family Practice    Contact information:    [address] [place] [address] [phone]          Follow up with [facility] Emergency Department.    Specialty:  EMERGENCY MEDICINE    Why:  If symptoms worsen    Contact information:    [address] [place] [address] [phone]    Additional information:    From [facility] [address] [facility]: Exit at [address] on south side of [place]. Turn right on [address]. Turn left at stoplight on [address]. [facility] will be in view two blocks ahead        Discharge Instructions         Relieving Back Pain  Back pain is a common problem. You can strain back muscles by lifting too much weight or just by moving the wrong way. Back strain can be uncomfortable, even painful. And it can take weeks or months to improve. To help yourself feel better and prevent future back strains, try these tips.  Important Note: Do not give aspirin to children or teens without first discussing it with your healthcare provider.      ? Ice    Ice reduces muscle pain and swelling. It helps most during the first 24 to 48 hours after an injury.    Wrap an ice pack or a bag of frozen peas in a thin towel. (Never place ice directly on your skin.)    Place the ice where your back hurts the most.    Don t ice for more  than 20 minutes at a time.    You can use ice several times a day.  ? Medicines  Over-the-counter pain relievers can include acetaminophen and anti-inflammatory medicines, which includes aspirin or ibuprofen. They can help ease discomfort. Some also reduce swelling.    Tell your healthcare provider about any medicines you are already taking.    Take medicines only as directed.  ? Heat  After the first 48 hours, heat can relax sore muscles and improve blood flow.    Try a warm bath or shower. Or use a heating pad set on low. To prevent a burn, keep a cloth between you and the heating pad.    Don t use a heating pad for more than 15 minutes at a time. Never sleep on a heating pad.  Date Last Reviewed: 9/1/2015 2000-2017 [employer]. [address] [place] [address], [place] [address]. All rights reserved. This information is not intended as a substitute for professional medical care. Always follow your healthcare professional's instructions.          24 Hour Appointment Hotline       To make an appointment at any [facility], call [phone] ([phone]). If you don't have a family doctor or clinic, we will help you find one. [facility] clinics are conveniently located to serve the needs of you and your family.             Review of your medicines      START taking        Dose / Directions Last dose taken    HYDROcodone-acetaminophen 5-325 MG per tablet   Commonly known as:  NORCO   Dose:  1-2 tablet   Quantity:  18 tablet        Take 1-2 tablets by mouth every 6 hours as needed for pain   Refills:  0                Information about OPIOIDS     PRESCRIPTION OPIOIDS: WHAT YOU NEED TO KNOW   We gave you an opioid (narcotic) pain medicine. It is important to manage your pain, but opioids are not always the best choice. You should first try all the other options your care team gave you. Take this medicine for as short a time (and as few doses) as possible.    Some activities can increase your pain, such  as bandage changes or therapy sessions. It may help to take your pain medicine 30 to 60 minutes before these activities. Reduce your stress by getting enough sleep, working on hobbies you enjoy and practicing relaxation or meditation. Talk to your care team about ways to manage your pain beyond prescription opioids.    These medicines have risks:    DO NOT drive when on new or higher doses of pain medicine. These medicines can affect your alertness and reaction times, and you could be arrested for driving under the influence (DUI). If you need to use opioids long-term, talk to your care team about driving.    DO NOT operate heavy machinery    DO NOT do any other dangerous activities while taking these medicines.    DO NOT drink any alcohol while taking these medicines.     If the opioid prescribed includes acetaminophen, DO NOT take with any other medicines that contain acetaminophen. Read all labels carefully. Look for the word  acetaminophen  or  Tylenol.  Ask your pharmacist if you have questions or are unsure.    You can get addicted to pain medicines, especially if you have a history of addiction (chemical, alcohol or substance dependence). Talk to your care team about ways to reduce this risk.    All opioids tend to cause constipation. Drink plenty of water and eat foods that have a lot of fiber, such as fruits, vegetables, prune juice, apple juice and high-fiber cereal. Take a laxative (Miralax, milk of magnesia, Colace, Senna) if you don t move your bowels at least every other day. Other side effects include upset stomach, sleepiness, dizziness, throwing up, tolerance (needing more of the medicine to have the same effect), physical dependence and slowed breathing.    Store your pills in a secure place, locked if possible. We will not replace any lost or stolen medicine. If you don t finish your medicine, please throw away (dispose) as directed by your pharmacist. The [place] [employer] has  "more information about safe disposal: https://www.pca.[facility].[place].us/living-green/managing-unwanted-medications        Prescriptions were sent or printed at these locations (1 Prescription)                   [facility] - [place], [place] - [address]   [address] [facility] [address]    Telephone:  [phone]   Fax:  [phone]   Hours:                  Printed at Department/Unit printer (1 of 1)         HYDROcodone-acetaminophen (NORCO) 5-325 MG per tablet                Orders Needing Specimen Collection     None      Pending Results     No orders found from 10/11/2018 to 10/14/2018.            Pending Culture Results     No orders found from 10/11/2018 to 10/14/2018.            Pending Results Instructions     If you had any lab results that were not finalized at the time of your Discharge, you can call the ED Lab Result RN at [phone]. You will be contacted by this team for any positive Lab results or changes in treatment. The nurses are available 7 days a week from 10A to 6:30P.  You can leave a message 24 hours per day and they will return your call.        Thank you for choosing [facility]       Thank you for choosing [facility] for your care. Our goal is always to provide you with excellent care. Hearing back from our patients is one way we can continue to improve our services. Please take a few minutes to complete the written survey that you may receive in the mail after you visit with us. Thank you!        [employer] Information     [employer] lets you send messages to your doctor, view your test results, renew your prescriptions, schedule appointments and more. To sign up, go to [email or web address] . Click on \"Log in\" on the left side of the screen, which will take you to the Welcome page. Then click on \"Sign up Now\" on the right side of the page.     You will be asked to enter the access code listed below, as well as some personal information. Please follow the " directions to create your username and password.     Your access code is: [email or web address]  Expires: 2019  6:59 PM     Your access code will  in 90 days. If you need help or a new code, please call your [facility] clinic or [phone].        Care EveryWhere ID     This is your Care EveryWhere ID. This could be used by other organizations to access your [facility] medical records  [id number]        Equal Access to Services     [facility] LISA : Hadii caleb santiago hadasho Soomaali, waaxda luqadaha, qaybta kaalmada adeegyada, brenden marshall . So [facility] [phone].    ATENCIÓN: Si habla español, tiene a august disposición servicios gratuitos de asistencia lingüística. Llame al [phone].    We comply with applicable federal civil rights laws and [place] laws. We do not discriminate on the basis of race, color, national origin, age, disability, sex, sexual orientation, or gender identity.            After Visit Summary       This is your record. Keep this with you and show to your community pharmacist(s) and doctor(s) at your next visit.

## 2018-10-13 NOTE — ED TRIAGE NOTES
Pt c/o sudden onset of LBP after picking up his 2 year old. Denies loss of bowel or bladder.  No tingling, but has had had some intermittent numbness.

## 2018-10-14 NOTE — ED PROVIDER NOTES
History     Chief Complaint   Patient presents with     Back Pain     HPI  Barron Angel is a 38 year old male who presents to the emergency department today with complaints of lower back pain after picking up his 2-year-old earlier this morning.  Patient has been taking Tylenol for pain with no relief.  Patient did use icy hot without relief either.  Any movement causes pain.  Patient denies any history of back problems in the past.  Patient denies any unilateral leg weakness or loss of bowel or bladder.    Problem List:    Patient Active Problem List    Diagnosis Date Noted     CARDIOVASCULAR SCREENING; LDL GOAL LESS THAN 160 10/31/2010     Priority: Medium        Past Medical History:    No past medical history on file.    Past Surgical History:    Past Surgical History:   Procedure Laterality Date     C TIBIA FX.BRACE;SEMIRIGID  2004    Fractured right tibia, fixator gregory     HC REMOVE FOREIGN BODY SIMPLE  01/10/06    Right hand     LAP VENTRAL HERNIA REPAIR  8/19/2010    open procedure       Family History:    Family History   Problem Relation Age of Onset     Diabetes Maternal Grandfather      Diabetes Paternal Grandfather        Social History:  Marital Status:  Single [1]  Social History   Substance Use Topics     Smoking status: Current Every Day Smoker     Packs/day: 0.50     Years: 20.00     Types: Cigarettes     Smokeless tobacco: Former User      Comment: started age 16     Alcohol use 0.0 oz/week     0 Standard drinks or equivalent per week      Comment: not very often        Medications:      HYDROcodone-acetaminophen (NORCO) 5-325 MG per tablet         Review of Systems   Musculoskeletal: Positive for back pain.   All other systems reviewed and are negative.      Physical Exam   BP: 120/87  Pulse: 95  Temp: 98.1  F (36.7  C)  Resp: 16  Weight: 70.3 kg (155 lb)  SpO2: 97 %      Physical Exam   Constitutional: He is oriented to person, place, and time. He appears well-developed and well-nourished.  No distress.   HENT:   Head: Normocephalic.   Eyes: Conjunctivae are normal.   Neck: Normal range of motion.   Cardiovascular: Normal rate and regular rhythm.    Pulmonary/Chest: Effort normal and breath sounds normal.   Abdominal: Soft. Bowel sounds are normal.   Musculoskeletal: Normal range of motion. He exhibits tenderness (Midline lower lumbar as well as right lower lumbar region). He exhibits no edema or deformity.   Neurological: He is alert and oriented to person, place, and time. He has normal reflexes. He displays normal reflexes. No cranial nerve deficit. He exhibits normal muscle tone. Coordination normal.   Skin: Skin is warm and dry. He is not diaphoretic.   Psychiatric: He has a normal mood and affect.       ED Course     ED Course     Procedures      No results found for this or any previous visit (from the past 24 hour(s)).    Medications   Lidocaine (LIDOCARE) 4 % Patch 2 patch (not administered)       Assessments & Plan (with Medical Decision Making)  Lower lumbar back pain rating to right lower lumbar region without evidence of sciatica.  DTRs intact, patient has strength 5 out of 5 bilateral lower extremities and upper extremities.  Patient has no evidence of cauda equina.  Exam findings consistent with a lower back strain.  Discussed with patient strengthening exercises after pain is resolved, encouraged ibuprofen, lidocaine patches were applied prior to discharge which patient can continue with if he feels these are helpful.  I did prescribe patient a small supply of Norco for severe pain, narcotic safety and side effects were discussed in detail.  Patient was encouraged to follow-up with primary care for symptoms are not resolved in the next several days.  Reasons to return to the emergency department were discussed in detail.  Patient is agreeable to plan of care and discharged in stable condition.     I have reviewed the nursing notes.    I have reviewed the findings, diagnosis, plan and  need for follow up with the patient.    New Prescriptions    HYDROCODONE-ACETAMINOPHEN (NORCO) 5-325 MG PER TABLET    Take 1-2 tablets by mouth every 6 hours as needed for pain       Final diagnoses:   Acute midline low back pain without sciatica - Mild lower right back pain as well, no sciatica       10/13/2018   [facility] EMERGENCY DEPARTMENT     Debra Villasenor, CANDY CNP  10/13/18 5180

## 2019-05-14 NOTE — ED NOTES
Patient states hes had left sided neck pain since 0900. No known injury   Patient will  Check vit D level soon.

## 2021-05-18 ENCOUNTER — VIRTUAL VISIT (OUTPATIENT)
Dept: FAMILY MEDICINE | Facility: CLINIC | Age: 41
End: 2021-05-18
Payer: COMMERCIAL

## 2021-05-18 DIAGNOSIS — G44.83 COUGH HEADACHE: ICD-10-CM

## 2021-05-18 DIAGNOSIS — U07.1 COVID-19: Primary | ICD-10-CM

## 2021-05-18 PROCEDURE — 99203 OFFICE O/P NEW LOW 30 MIN: CPT | Performed by: NURSE PRACTITIONER

## 2021-05-18 NOTE — PROGRESS NOTES
Barron is a 41 year old who is being evaluated via a billable telephone visit.      What phone number would you like to be contacted at? [phone]  How would you like to obtain your AVS? Mail a copy    Assessment & Plan     COVID-19  - Sick for the last 4 days. Multiple symptoms which relate to COVID, body aches, congestion, loss of smell. Fatigue.   - Discussed symptoms for which he would need to present to the ED to include worsening SOB and severe fatigue.   - I will send him the AVS with information regarding testing and COVID self care, he does not have or know how to use Mchart.   - He was instructed to call clinic if symptoms do not start to improve over the next 7-10 days.     Cough headache  As above.   - Symptomatic COVID-19 Virus (Coronavirus) by PCR; Future        Return in about 2 weeks (around 6/1/2021), or if symptoms worsen or fail to improve.    Deep Kenney NP  [facility]   Barron is a 41 year old who presents for the following health issues     HPI       Concern for COVID-19  About how many days ago did these symptoms start? Thursday last week   Is this your first visit for this illness? Yes  In the 14 days before your symptoms started, have you had close contact with someone with COVID-19 (Coronavirus)? I do not know.  Do you have a fever or chills? Yes, I felt feverish or had chills  Are you having new or worsening difficulty breathing? Yes   Please describe what kind of difficulty you are having breathing:Mild dyspnea (able to do ADLs without difficulty, mild shortness of breath with activities such as climbing one or two flights of stairs or walking briskly)  Do you have new or worsening cough? Yes, I am coughing up mucus. and dry at the same time  Have you had any new or unexplained body aches? YES    Have you experienced any of the following NEW symptoms?    Headache: YES    Sore throat: No    Loss of taste or smell: YES    Chest pain:  No    Diarrhea: YES    Rash: No  What treatments have you tried? Drinking more fluids, taking OTC allergy medication.   Who do you live with? Wife, kids  Are you, or a household member, a healthcare worker or a ? No  Do you live in a nursing home, group home, or shelter? No  Do you have a way to get food/medications if quarantined? Yes     Symptoms as above. His mom is a nurse and is helping to monitor symptoms. No so short of breath he can not do normal ADL. Some stomach upset, thinks related to the phlegm. No blood in Sputum. No fevers over 100.5, more chills and body aches. He is drinking lots of fluids, urinating more do to increased fluid intake. Resting lots. Needs COVID test for work. Does not know how to access mychart.     Review of Systems   Constitutional, HEENT, cardiovascular, pulmonary, gi and gu systems are negative, except as otherwise noted.      Objective           Vitals:  No vitals were obtained today due to virtual visit.    Physical Exam   alert and no distress  PSYCH: Alert and oriented times 3; coherent speech, normal   rate and volume, able to articulate logical thoughts, able   to abstract reason, no tangential thoughts, no hallucinations   or delusions  His affect is normal  RESP: He was congested, no audible wheezing, able to talk in full sentences  Remainder of exam unable to be completed due to telephone visits                Phone call duration: 15 minutes

## 2021-05-18 NOTE — PATIENT INSTRUCTIONS
Patient Education     Coronavirus Disease 2019 (COVID-19): Caring for Yourself or Others   If you or a household member have symptoms of COVID-19, follow the guidelines below. This will help you manage symptoms and keep the virus from spreading.  If you have symptoms of COVID-19    Stay home and contact your care team. They will tell you what to do. You may be told to stay home and away from others (self-isolate). You may also be told to stay at least 6 feet from others (social distancing).    Stay away from work, school, and public places.    Limit physical contact with others in your home. Limit visitors. No kissing.  Clean surfaces you touch with disinfectant.  If you need to cough or sneeze, do it into a tissue. Then throw the tissue into the trash. If you don't have tissues, cough or sneeze into the bend of your elbow.  Don t share food or personal items with people in your household. This includes items like eating and drinking utensils, towels, and bedding.  Wear a cloth face mask around other people. During a public health emergency, medical face masks may be reserved for healthcare workers. You may need to make a cloth face mask of your own. You can do this using a bandana, T-shirt, or other cloth. The CDC has instructions on how to make a face mask. Wear the mask so that it covers both your nose and mouth.  If you need to go to a hospital or clinic, call ahead to let them know. Expect the care team to wear masks, gowns, gloves, and eye protection. You may need to come to a different entrance or wait in a separate room.  Follow all instructions from your care team.    If you ve been diagnosed with COVID-19    Stay home and away from others, including other people in your home. (This is called self-isolation.) Don t leave home unless you need to get medical care. Don t go to work, school, or public places. Don t use buses, taxis, or other public transportation.    Follow all instructions from your care  team.    If you need to go to a hospital or clinic, call ahead to let them know. Expect the care team to wear masks, gowns, gloves, and eye protection. You may need to come to a different entrance or wait in a separate room.    Wear a face mask over your nose and mouth. This is to protect others from your germs. If you can t wear a mask, your caregivers should wear one. You may need to make your own mask using a bandana, T-shirt, or other cloth. See the CDC s instructions on how to make a face mask.    Have no contact with pets and other animals.    Don t share food or personal items with people in your household. This includes items like eating and drinking utensils, towels, and bedding.    If you need to cough or sneeze, do it into a tissue. Then throw the tissue into the trash. If you don't have tissues, cough or sneeze into the bend of your elbow.    Wash your hands often.    Self-care at home  The FDA has approved an antiviral medicine (called remdesivir) for people being treated in the hospital. This is for people 12 years and older who weigh more than about 88 pounds (40 kgs). In certain cases, it may also be used for people younger than 12 years or who weigh less than about 88 pounds (40 kgs)..  Currently, treatment is mostly aimed at helping your body while it fights the virus.    Getting extra rest.    Drink extra fluids 6 to 8 glasses of liquids each day), unless a doctor has told you not to. Ask your care team which fluids are best for you. Avoid fluids that contain caffeine or alcohol.    Taking over-the-counter (OTC) medicine to reduce pain and fever. Your care team will tell you which medicine to use.  If you ve been in the hospital for COVID-19, follow your care team s instructions. They will tell you when to stop self-isolation. They may also have you change positions to help your breathing (such as lying on your belly).  If a test showed that you have COVID-19, you may be asked to donate plasma  after you ve recovered. (This is called COVID-19 convalescent plasma donation.) The plasma may contain antibodies to help fight the virus in others. Experts don't know the safety of plasma or how well it works. Research continues, and the FDA has approved it for emergency use in certain people with serious or life-threatening COVID-19.  Caring for a sick person     Follow all instructions from the care team.    Wash your hands often.    Wear protective clothing as advised.    Make sure the sick person wears a mask. If they can't wear a mask, don t stay in the same room with them. If you must be in the same room, wear a face mask. Make sure the mask covers both the nose and mouth.    Keep track of the sick person s symptoms.    Clean surfaces often with disinfectant. This includes phones, kitchen counters, fridge door handles, bathroom surfaces, and others.    Don t let anyone share household items with the sick person. This includes eating and drinking tools, towels, sheets, or blankets.    Clean fabrics and laundry well.    Keep other people and pets away from the sick person.    When you can stop self-isolation  When you are sick with COVID-19, you should stay away from other people. This is called self-isolation. The rules for ending self-isolation depend on your health in general.  If you are normally healthy:  You can stop self-isolation when all 3 of these are true:    You ve had no fever--and no medicine that reduces fever--for at least 24 hours. And     Your symptoms are better, such as cough or trouble breathing. And     At least 10 days have passed since your symptoms first started.  Talk with your care team before you leave home. They may tell you it s okay to leave, or they may give you different advice. You do not need to be re-tested.  If you have a weak immune system, or you ve had severe COVID-19:  Follow your care team s instructions. You may be asked to self-isolate for 10 days to 20 days after  your symptoms first started. Your care team may want to re-test you for COVID-19.  Note: If you re being treated for cancer, have an immune disorder (such as HIV), or have had a transplant (organ or bone marrow), you may have a weak immune system.  If you've already had COVID-19 once:  If you had the virus over 3 months ago, and you ve been exposed again, treat it like you've never had COVID-19. Stay home, limit your contact with others, call your care team, and watch for symptoms.  If it s been less than 3 months since you had the virus, you re symptom-free, and you ve been exposed again: You don t need to self-isolate. You don t need to be re-tested, unless you have new symptoms of COVID-19 that can t be linked to another illness. But if you develop new symptoms, stay home. Contact your care team if you have questions.  When you return to public settings  When you are well enough to go outside your home, follow the CDC s guidance on cloth face masks.    Anyone over age 2 should wear a face mask in public, especially when it's hard to socially distance. This includes public transit, public protests and marches, and crowded stores, bars, and restaurants.    Face masks are most likely to reduce the spread of COVID-19 when they are widely used by people who are out in the public.  Certain people should not wear a face covering. These include:    Children under 2 years old    Anyone with a health, developmental, or mental health condition that can be made worse by wearing a mask    Anyone who is unconscious or unable to remove the face covering without help. See the CDC's guidance on who should not wear a face mask.  When to call your care team  Call your care team right away if a sick person has any of these:    Trouble breathing    Pain or pressure in chest  If a sick person has any of these, call 911:    Trouble breathing that gets worse    Pain or pressure in chest that gets worse    Blue tint to lips or  face    Fast or irregular heartbeat    Confusion or trouble waking    Fainting or loss of consciousness    Coughing up blood  Going home from the hospital   If you have COVID-19 and were recently in the hospital:    Follow the instructions above for self-care and isolation.    Follow the hospital care team s instructions.    Ask questions if anything is unclear to you. Write down answers so you remember them.  Date last modified: 1/15/2021  Emmanuel last reviewed this educational content on 4/1/2020  This information has been modified by your health care provider with permission from the publisher.    1520-3911 [employer]. [address] [place] [id number]. All rights reserved. This information is not intended as a substitute for professional medical care. Always follow your healthcare professional's instructions.           Patient Education     For Patients Who Have Been Tested for COVID-19 (Coronavirus)  You have been tested for COVID-19 (coronavirus). Results are typically available in 1 to 3 days. Our testing sites do not have access to your test results.  If you have not received your results in 5 days, please do the following:    If you are being tested before surgery: Call your surgeon's office or call [phone] ([phone]) and ask to speak with our COVID-19 results team.    If you are being treated at an infusion center: Call your infusion center directly.    All others: Call [phone] ([phone]) and ask to speak with our COVID-19 results team.  What you should do if you have COVID-19 symptoms  Please stay home and away from others (self-isolate) until:    You've had no fever--and no medicine that reduces fever--for 3 full days (72 hours), AND    Your other symptoms have gotten better. For example, your cough or breathing has improved, AND    At least 7 days have passed since your symptoms first started.  During this time:    Don't go to work, school or anywhere  else.    Stay away from others in your home. No hugging, kissing or shaking hands.    Don't let anyone visit.    Cover your mouth and nose with a mask, tissue or washcloth to avoid spreading germs.    Wash your hands and face often. Use soap and water.  When to call for 911 for medical help  If you have any of these emergency warning signs for COVID-19, call for medical help right away:    Trouble breathing    Pain or pressure in the chest all the time    Blue color to your lips or face  These are not all the symptoms you can have with COVID-19. Call your health provider for any other symptoms that are severe or concerning to you.  When you call 911, tell the  that you have -- or think you might have--COVID-19.   Where can I get more information?  To learn more about COVID-19 and how to care for yourself at home, please visit the CDC website at https://www.cdc.gov/coronavirus/2019-ncov/about/steps-when-sick.html  For more about your care at [facility], please visit [email or web address]  For informational purposes only. Not to replace the advice of your health care provider.   Clinically reviewed by Infection Prevention and the [facility] COVID-19 Clinical Team.  Copyright   2020 [facility]. All rights reserved. SMART [id number] - 05/20.

## 2021-12-20 ENCOUNTER — HOSPITAL ENCOUNTER (OUTPATIENT)
Dept: GENERAL RADIOLOGY | Facility: CLINIC | Age: 41
Discharge: HOME | End: 2021-12-20
Attending: NURSE PRACTITIONER | Admitting: NURSE PRACTITIONER
Payer: COMMERCIAL

## 2021-12-20 ENCOUNTER — OFFICE VISIT (OUTPATIENT)
Dept: FAMILY MEDICINE | Facility: CLINIC | Age: 41
End: 2021-12-20
Payer: COMMERCIAL

## 2021-12-20 VITALS
OXYGEN SATURATION: 98 % | HEART RATE: 91 BPM | BODY MASS INDEX: 21.94 KG/M2 | WEIGHT: 161.8 LBS | DIASTOLIC BLOOD PRESSURE: 80 MMHG | SYSTOLIC BLOOD PRESSURE: 134 MMHG | TEMPERATURE: 98 F

## 2021-12-20 DIAGNOSIS — R20.2 NUMBNESS AND TINGLING OF BOTH UPPER EXTREMITIES: ICD-10-CM

## 2021-12-20 DIAGNOSIS — R20.0 NUMBNESS AND TINGLING OF BOTH UPPER EXTREMITIES: ICD-10-CM

## 2021-12-20 DIAGNOSIS — M54.2 CERVICALGIA: Primary | ICD-10-CM

## 2021-12-20 PROCEDURE — 72040 X-RAY EXAM NECK SPINE 2-3 VW: CPT

## 2021-12-20 PROCEDURE — 99214 OFFICE O/P EST MOD 30 MIN: CPT | Performed by: NURSE PRACTITIONER

## 2021-12-20 RX ORDER — CYCLOBENZAPRINE HCL 10 MG
10 TABLET ORAL 3 TIMES DAILY PRN
Qty: 21 TABLET | Refills: 0 | Status: SHIPPED | OUTPATIENT
Start: 2021-12-20 | End: 2022-06-16

## 2021-12-20 ASSESSMENT — PAIN SCALES - GENERAL: PAINLEVEL: MODERATE PAIN (5)

## 2021-12-20 NOTE — PROGRESS NOTES
Assessment & Plan     Cervicalgia    - XR Cervical Spine 2/3 Views  - Physical Therapy Referral; Future  - cyclobenzaprine (FLEXERIL) 10 MG tablet; Take 1 tablet (10 mg) by mouth 3 times daily as needed for muscle spasms  - Orthopedic  Referral; Future    Numbness and tingling of both upper extremities    - XR Cervical Spine 2/3 Views  - Physical Therapy Referral; Future  - cyclobenzaprine (FLEXERIL) 10 MG tablet; Take 1 tablet (10 mg) by mouth 3 times daily as needed for muscle spasms  - Orthopedic  Referral; Future    Recommend home care see avs reviewed will monitor symptoms closely. 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             Patient Instructions                    Self Care at Home Instructions  Conservative Treatment    Remaining active supports a quicker recovery, keep moving. (ex. Walking, increase time & speed as tolerated).    Maintain routine activity with attention to correct posture; stop aggravating activities.    Over-the-counter pain medications for short term symptom control. (See below)    Muscle relaxants are sometimes helpful for few days, but may cause drowsiness. (See below)    Cold packs or heat based upon your preference.    Most people improve within 2 weeks; most have significant improvement within 4 weeks.    If symptoms worsen or you experience numbness, contact your provider immediately.    Medications for Pain Relief  Recommended over-the-counter non-steroidal anti-inflammatories:     Ibuprofen (Advil, Motrin) 800 mg. three times a day as needed for pain      OR   Naproxen Sodium (Aleve) 220-440 mg. two times per day as needed for pain    If you have been prescribed a muscle relaxant (*cyclobenzapine 10 mg.)  Take    - 1 tablet at bedtime  *May cause drowsiness. Do not drive when taking this medication.        Return in about 4 weeks (around 1/17/2022).    CANDY Mandujano [facility]  MARVA Mart is a 41 year old who presents for the following health issues     HPI     Pain History:  When did you first notice your pain? - 1 to 6 weeks   Have you seen any provider previously for this issue? No  How has your pain affected your ability to work? Not currently working - unrelated to pain  Where in your body do you have pain? Musculoskeletal problem/pain  Onset/Duration: since beginning of November  Description  Location: hand - bilateral  Joint Swelling: YES  Redness: no  Pain: YES  Warmth: no  Intensity:  moderate  Progression of Symptoms:  Worsening, wakes up at night  Accompanying signs and symptoms:   Fevers: no  Numbness/tingling/weakness: YES- numbness, tingling, and weakness, unable to grasp things   History  Trauma to the area: no  Recent illness:  no  Previous similar problem: no  Previous evaluation:  no  Precipitating or alleviating factors:  Aggravating factors include: none  Therapies tried and outcome: lidocaine patch    Patient left hand dominant with history of > 6weeks bilateral upper extremity weakness and pain with numbness of hands and fingers. He has seen chiropractic without improvement. Has been using Nsaid, tylenol ice heat and rest. He works in construction mostly concrete work. Pain worse at night.       Review of Systems   Constitutional, HEENT, cardiovascular, pulmonary, GI, , musculoskeletal, neuro, skin, endocrine and psych systems are negative, except as otherwise noted.      Objective    /80   Pulse 91   Temp 98  F (36.7  C) (Temporal)   Wt 73.4 kg (161 lb 12.8 oz)   SpO2 98%   BMI 21.94 kg/m    Body mass index is 21.94 kg/m .  Physical Exam   GENERAL: alert  NECK: no adenopathy, no asymmetry, masses, or scars and thyroid normal to palpation  RESP: lungs clear to auscultation - no rales, rhonchi or wheezes  CV: regular rate and rhythm, normal S1 S2, no S3 or S4, no murmur, click or rub, no peripheral edema and peripheral pulses  strong  MS:   SHOULDER Exam-Bilateral   Inspection: no swelling, no bruising, no discoloration, no obvious deformity, no asymmetry, no glenohumeral joint anterior bulge, no distal clavicle elevation, no muscle atrophy, no scapular winging   Tenderness of: SC joint- no, clavicle(prox-mid)- no, clavicle-(mid-distal)- no, AC joint- no, acromion- no, anterior capsule- no, prox bicep tendon- no, greater tuberosity- no, prox humerus- no, supraspinatous- no, infraspinatous- no, superior trapezious- no, rhomboids- no   Range of Motion: Active- forward flexion- normal, abduction- normal, external rotation- normal, internal rotation- normal  Range of Motion: Passive- forward flexion- normal, abduction- normal, external rotation- normal, internal rotation- normal   Strength: forward flexion- 5/5, abduction- 5/5, internal rotation- 5/5, external rotation- 5/5 and bicep- full   Special tests: none        Elbow Exam: Inspection: no swelling, no olecranon bursa swelling, no distal bicep tendon defect  Tender: lateral epicondyle and medial epicondyle  Range of Motion: full  Strength: elbow strength full  Special tests: ulnar Tinel's positive: bilateral    Wrist Exam: WRIST:  Inspection: no swelling  no effusion  Palpation: Tender: none  Range of Motion: normal, full finger ROM  Strength:  strength weak L>R.      Cervical Spine Exam: Inspection: increased cervical kyphosis, rigid cervical spine, no scapular winging  Tender:  left trapezius muscles, right trapezius muscles  Range of Motion:  flexion:  decreased, extension: decreased, left lateral bending: decreased, right lateral bending: decreased, left lateral rotation:  decreased, right lateral rotation:  decreased  Strength: Full strength of all neck muscles  Special tests:  Spurling's - negative - left, Spurling's - negative - right, Tinel's - positive - left, Tinel's - positive - right    SKIN: no suspicious lesions or rashes  NEURO: Normal strength and tone, mentation  intact and speech normal  PSYCH: mentation appears normal, affect normal/bright

## 2021-12-20 NOTE — PATIENT INSTRUCTIONS
Self Care at Home Instructions  Conservative Treatment    Remaining active supports a quicker recovery, keep moving. (ex. Walking, increase time & speed as tolerated).    Maintain routine activity with attention to correct posture; stop aggravating activities.    Over-the-counter pain medications for short term symptom control. (See below)    Muscle relaxants are sometimes helpful for few days, but may cause drowsiness. (See below)    Cold packs or heat based upon your preference.    Most people improve within 2 weeks; most have significant improvement within 4 weeks.    If symptoms worsen or you experience numbness, contact your provider immediately.    Medications for Pain Relief  Recommended over-the-counter non-steroidal anti-inflammatories:     Ibuprofen (Advil, Motrin) 800 mg. three times a day as needed for pain      OR   Naproxen Sodium (Aleve) 220-440 mg. two times per day as needed for pain    If you have been prescribed a muscle relaxant (*cyclobenzapine 10 mg.)  Take    - 1 tablet at bedtime  *May cause drowsiness. Do not drive when taking this medication.

## 2021-12-21 NOTE — RESULT ENCOUNTER NOTE
Please advise Barron Angel,  1980, that his xray results per radiology read normal cervical spine. Please continue treatment plan with physical therapy. If symptoms do not improve follow orthopedic as discussed.   [phone] (home)   Thank you  Jonna Aaron CNP

## 2022-01-21 ENCOUNTER — HOSPITAL ENCOUNTER (EMERGENCY)
Facility: CLINIC | Age: 42
Discharge: HOME | End: 2022-01-21
Attending: EMERGENCY MEDICINE | Admitting: EMERGENCY MEDICINE
Payer: COMMERCIAL

## 2022-01-21 VITALS
HEART RATE: 78 BPM | RESPIRATION RATE: 16 BRPM | TEMPERATURE: 97.7 F | OXYGEN SATURATION: 99 % | DIASTOLIC BLOOD PRESSURE: 73 MMHG | BODY MASS INDEX: 21.16 KG/M2 | SYSTOLIC BLOOD PRESSURE: 137 MMHG | WEIGHT: 156 LBS

## 2022-01-21 DIAGNOSIS — G56.03 BILATERAL CARPAL TUNNEL SYNDROME: ICD-10-CM

## 2022-01-21 PROCEDURE — 99284 EMERGENCY DEPT VISIT MOD MDM: CPT | Performed by: EMERGENCY MEDICINE

## 2022-01-21 PROCEDURE — 99283 EMERGENCY DEPT VISIT LOW MDM: CPT

## 2022-01-21 RX ORDER — METHYLPREDNISOLONE 4 MG
TABLET, DOSE PACK ORAL
Qty: 21 TABLET | Refills: 0 | Status: SHIPPED | OUTPATIENT
Start: 2022-01-21 | End: 2022-06-16

## 2022-01-21 NOTE — DISCHARGE INSTRUCTIONS
I suspect you have irritation of the median nerve at your wrist.  This can be caused by repetitive wrist activities at work.  Treatment involves limiting those activities.  Immobilization with wrist splints.  Ice for swelling.  Ibuprofen and steroids.  Would like to have you see the orthopedist if symptoms persist.  They may inject steroids directly into that area or if bad enough recommend surgical intervention.

## 2022-01-21 NOTE — LETTER
January 21, 2022      To Whom It May Concern:      Barron MARY BETH Angel was seen in our Emergency Department today, 01/21/22.    Please excuse him from work today.    Sincerely,        Wilmerperlita Brown MD

## 2022-01-21 NOTE — ED TRIAGE NOTES
Pt presents with bilateral arm from elbow down numbness and pain that started weds nite. 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 Pt had similar episode in December had neck and shoulder x rays then and given flexeril. Pt states went back to work and symptoms started again.

## 2022-01-21 NOTE — ED PROVIDER NOTES
History     Chief Complaint   Patient presents with     Hand Pain     Bilateral lower arms painful and numb. 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      HPI  Barron Angel is a 41 year old male who presents with bilateral forearm and hand pain that began on Wednesday night.  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  Had a similar episode back in December and was evaluated by primary care.  X-ray imaging did not show any acute abnormality and he was prescribed Flexeril.  Patient states he went back to work and symptoms started up again.  He denies headache or neck pain.  No back pain.  No lower extremity issues.  No treatment prior to arrival.  He is right-hand dominant.    Allergies:  Allergies   Allergen Reactions     No Known Allergies        Problem List:    Patient Active Problem List    Diagnosis Date Noted     CARDIOVASCULAR SCREENING; LDL GOAL LESS THAN 160 10/31/2010     Priority: Medium        Past Medical History:    No past medical history on file.    Past Surgical History:    Past Surgical History:   Procedure Laterality Date     C TIBIA FX.BRACE;SEMIRIGID  2004    Fractured right tibia, fixator gregory     HC REMOVE FOREIGN BODY SIMPLE  01/10/06    Right hand     LAP VENTRAL HERNIA REPAIR  8/19/2010    open procedure       Family History:    Family History   Problem Relation Age of Onset     Diabetes Maternal Grandfather      Diabetes Paternal Grandfather        Social History:  Marital Status:   [2]  Social History     Tobacco Use     Smoking status: Current Every Day Smoker     Packs/day: 0.50     Years: 20.00     Pack years: 10.00     Types: Cigarettes     Smokeless tobacco: Former User     Tobacco comment: started age 16   Substance Use Topics     Alcohol use: Yes     Alcohol/week: 0.0 standard drinks     Comment: not very often     Drug use: No        Medications:    methylPREDNISolone (MEDROL DOSEPAK) 4 MG tablet therapy pack  cyclobenzaprine (FLEXERIL) 10  MG tablet          Review of Systems all other systems are reviewed and are negative.    Physical Exam   BP: 137/73  Pulse: 78  Temp: 97.7  F (36.5  C)  Resp: 16  Weight: 70.8 kg (156 lb)  SpO2: 99 %      Physical Exam alert cooperative male in mild distress.  HEENT is unremarkable.  Neck is supple without limitation.  No reduction of his arm or hand symptoms with manipulation of the neck.  This is also true about the shoulder he has full range of motion without impingement signs.  He has no effusion of the shoulder.  The elbow is unremarkable and again no reproduction of symptoms with manipulation of the elbow or palpation over the elbow.  On the wrist there is no obvious swelling.  He has intact pulses.  He has positive Phalen's bilaterally with the right greater than left.  He also has positive Tinel's with the left greater than right.  Intact strength and sensation.  No muscle wasting of the intrinsic muscles.  Sensory exams intact.    ED Course                 Procedures              Critical Care time:  none               No results found for this or any previous visit (from the past 24 hour(s)).    Medications - No data to display    Assessments & Plan (with Medical Decision Making)   Barron Angel is a 41 year old male who presents with bilateral forearm and hand pain that began on Wednesday night.  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  Had a similar episode back in December and was evaluated by primary care.  X-ray imaging did not show any acute abnormality and he was prescribed Flexeril.  Patient states he went back to work and symptoms started up again.  He denies headache or neck pain.  No back pain.  No lower extremity issues.  No treatment prior to arrival.  He is right-hand dominant.  On exam patient has reproduction of symptoms with manipulation of his wrist.  He has positive Phalen's and Tinel signs.  Does not have any radicular symptoms with manipulation  of his neck, shoulders, or elbows.  Suspect carpal tunnel with repetitive work activities.  Splint is provided.  Medrol Dosepak.  Limit activities that reproduce symptoms.  Follow-up with orthopedics in consult is provided.  Reasons to return to emergency were discussed.  I have reviewed the nursing notes.    I have reviewed the findings, diagnosis, plan and need for follow up with the patient.       New Prescriptions    METHYLPREDNISOLONE (MEDROL DOSEPAK) 4 MG TABLET THERAPY PACK    Follow Package Directions       Final diagnoses:   Bilateral carpal tunnel syndrome       1/21/2022   [facility] EMERGENCY DEPT     Wilmer Brown MD  01/21/22 8078

## 2022-04-20 ENCOUNTER — OFFICE VISIT (OUTPATIENT)
Dept: ORTHOPEDICS | Facility: CLINIC | Age: 42
End: 2022-04-20
Attending: EMERGENCY MEDICINE
Payer: COMMERCIAL

## 2022-04-20 VITALS
HEIGHT: 73 IN | RESPIRATION RATE: 18 BRPM | WEIGHT: 155 LBS | DIASTOLIC BLOOD PRESSURE: 77 MMHG | BODY MASS INDEX: 20.54 KG/M2 | SYSTOLIC BLOOD PRESSURE: 136 MMHG

## 2022-04-20 DIAGNOSIS — G56.03 BILATERAL CARPAL TUNNEL SYNDROME: Primary | ICD-10-CM

## 2022-04-20 PROCEDURE — 99203 OFFICE O/P NEW LOW 30 MIN: CPT | Performed by: ORTHOPAEDIC SURGERY

## 2022-04-20 NOTE — PATIENT INSTRUCTIONS
[facility]    [address] [place] [address], [address]  [place] [address]    Neurology and [phone]  Office Fax: [phone]    What is an EMG?    An EMG is a neurodiagnostic test that measures the electrical activity of a muscle during contraction and relaxation. An EMG test generally consists of two parts: nerve conduction studies and a needle examination. Nerve conduction studies are useful in detecting nerve-related problems, such as nerve entrapment (for example, carpal tunnel syndrome), or more widespread nerve dysfunction. The needle examination is useful for identifying problems arising from a  pinched nerve  in the spine, as well as diseases of the muscles themselves. Both parts of the test are usually needed for the physician to obtain meaningful results. The results of this test help your doctor determine the cause of your symptoms and select the best treatment for you.        SMOKING CESSATION  What's in cigarette smoke? - Cigarette smoke contains over 4,000 chemicals. Nicotine is one of the main ingredients which is an insecticide/herbicide. It is poisonous to our nervous system, increases blood clotting risk, and decreases the body's defenses to fight off infection. Another chemical is Carbon Monoxide is an asphyxiating gas that permanently binds to blood cells and blocks the transport of oxygen. This leads to tissue death and decreases your metabolism. Tar is a chemical that coats your lungs and trachea which impairs new oxygen coming in and carbon dioxide getting out of your body.   How does smoking impact surgery? - Smoking is particularly hazardous with regards to surgery. Surgery puts stress on the body and a smoker's body is already under strain from these chemicals. Putting the two together, especially for an elective surgery, could be a recipe for disaster. Smoking before and after surgery increases your risk of heart problems, slow wound healing, delayed bone  healing, blood clots, wound infection and anesthesia complications.   What are the benefits of quitting? - In 20 minutes your blood pressure will drop back down to normal. In 8 hours the carbon monoxide (a toxic gas) levels in your blood stream will drop by half, and oxygen levels will return to normal. In 48 hours your chance of having a heart attack will have decreased. All nicotine will have left your body. Your sense of taste and smell will return to a normal level. In 72 hours your bronchial tubes will relax, and your energy levels will increase. In 2 weeks your circulation will increase, and it will continue to improve for the next 10 weeks.    Recommendations for elective surgery - Ideally, patients should quit smoking 8 weeks before and at least 2 weeks after elective surgery in order to avoid complications. Simply cutting back on the amount of cigarettes smoked per day does not offer any benefit or decrease the risk of poor wound healing, heart problems, and infection. Smokers should also start taking Vitamin C and B for two weeks before surgery and two weeks after surgery.    Ways to Stop Smokin. Nicotine patches, lozenges, or gum  2. Support Groups  3. Medications (see below)    List of Medications:  1. Varenicline Tartrate (CHANTIX) (may be discontinued by FDA as of 2021)  2. Bupropion HCL (WELLBUTRIN, ZYBAN) - note: make sure Wellbutrin is for smoking cessation and not other issues   3. Nicotine Patch (NICODERM)   4. Nicotine Inhaler (NICOTROL)   5. Nicotine Gum Nicotine Polacrilex   6. Nicotine Lozenge: Nicotine Polacrilex (COMMIT)   * [place] offers a smoking support group as well!  Please visit: [email or web address]  If you are interested in these, ask about getting a prescription or talk to your primary care doctor about what may be the best way for you to quit.

## 2022-04-20 NOTE — LETTER
4/20/2022         RE: Barron Angel  [address] [facility] [id number]        Dear Colleague,    Thank you for referring your patient, Barron Angel, to the [facility]. Please see a copy of my visit note below.    Barron Angel is a 42 year old male who is seen in emergency room follow-up for complaint of numbness of lateral aspect of bilateral hand, especially with use of the hand and at night. He has also had neck pains in past.  Pain in hands and up to elbows is achey and throbbing.  Rated 2/10.  He has had symptoms for 1 year.    Small finger is not involved.  Prior treatment used: Wrist splint - yes.  NSAID: - no.  He has had medrol dosepak in January.    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      PAST MEDICAL HISTORY:  Diabetes mellitus negative, hypothyroid negative.    EMG has not been performed.     History reviewed. No pertinent past medical history.    Past Surgical History:   Procedure Laterality Date     C TIBIA FX.BRACE;SEMIRIGID  2004    Fractured right tibia, fixator gregory     HC REMOVE FOREIGN BODY SIMPLE  01/10/06    Right hand     LAP VENTRAL HERNIA REPAIR  8/19/2010    open procedure       Family History   Problem Relation Age of Onset     Diabetes Maternal Grandfather      Diabetes Paternal Grandfather        Social History     Socioeconomic History     Marital status:      Spouse name: Not on file     Number of children: Not on file     Years of education: Not on file     Highest education level: Not on file   Occupational History     Occupation: Wood worker     Employer: [employer]   Tobacco Use     Smoking status: Current Every Day Smoker     Packs/day: 0.50     Years: 20.00     Pack years: 10.00     Types: Cigarettes     Smokeless tobacco: Former User     Tobacco comment: started age 16   Substance and Sexual Activity     Alcohol use: Yes     Alcohol/week: 0.0  "standard drinks     Comment: not very often     Drug use: No     Sexual activity: Yes     Partners: Female     Birth control/protection: Condom   Other Topics Concern     Parent/sibling w/ CABG, MI or angioplasty before 65F 55M? Not Asked   Social History Narrative     Not on file     Social Determinants of Health     Financial Resource Strain: Not on file   Food Insecurity: Not on file   Transportation Needs: Not on file   Physical Activity: Not on file   Stress: Not on file   Social Connections: Not on file   Intimate Partner Violence: Not on file   Housing Stability: Not on file       Current Outpatient Medications   Medication Sig Dispense Refill     cyclobenzaprine (FLEXERIL) 10 MG tablet Take 1 tablet (10 mg) by mouth 3 times daily as needed for muscle spasms 21 tablet 0     methylPREDNISolone (MEDROL DOSEPAK) 4 MG tablet therapy pack Follow Package Directions 21 tablet 0       Allergies   Allergen Reactions     No Known Allergies        REVIEW OF SYSTEMS:  CONSTITUTIONAL:  NEGATIVE for fever, chills, change in weight, not feeling tired  SKIN:  NEGATIVE for worrisome rashes, no skin lumps, no skin ulcers and no non-healing wounds  EYES:  NEGATIVE for vision changes or irritation.  ENT/MOUTH:  NEGATIVE.  No hearing loss, no hoarseness, no difficulty swallowing.  RESP:  NEGATIVE. No cough or shortness of breath.  BREAST:  NEGATIVE for masses, tenderness or discharge  CV:  NEGATIVE for chest pain, palpitations or peripheral edema  GI:  NEGATIVE for nausea, abdominal pain, heartburn, or change in bowel habits  :  Negative. No dysuria, no hematuria  MUSCULOSKELETAL:  See HPI above  NEURO:  NEGATIVE . No headaches, no dizziness,  no numbness  ENDOCRINE:  NEGATIVE for temperature intolerance, skin/hair changes  HEME/ALLERGY/IMMUNE:  NEGATIVE for bleeding problems  PSYCHIATRIC:  NEGATIVE. no anxiety, no depression.          O: He appears well,   Vitals: /77   Resp 18   Ht 1.854 m (6' 1\")   Wt 70.3 kg (155 " lb)   BMI 20.45 kg/m    BMI= Body mass index is 20.45 kg/m .   Cervical spine has full range of motion without pain.  Full range of motion of shoulder, elbows, wrists and fingers.  Hand exam revealed     Right: reduced sensation along median nerve distribution, + Phalen's maneuver, equivocal Tinel's sign, no thenar atrophy, no weakness of thumb/pinky pincer grasp.  Left: reduced sensation along median nerve distribution, + Phalen's maneuver, equivocal Tinel's sign, no thenar atrophy, no weakness of thumb/pinky pincer grasp   strength: Right normal, Left normal.      A: Likely Bilateral carpal tunnel syndrome.    Also has had cervical involvement.    P: Explanation of median nerve entrapment is given.  The treatment spectrum is discussed, including conservative treatment with night splint, NSAID, activity modification, and possible surgical release if the symptoms are persistent and severe.     Bilateral EMG ordered.  Return to clinic after EMG.        Again, thank you for allowing me to participate in the care of your patient.        Sincerely,        Guilherme Mcnulty MD

## 2022-04-23 PROBLEM — G56.03 BILATERAL CARPAL TUNNEL SYNDROME: Status: ACTIVE | Noted: 2022-04-23

## 2022-04-23 NOTE — PROGRESS NOTES
Barron Angel is a 42 year old male who is seen in emergency room follow-up for complaint of numbness of lateral aspect of bilateral hand, especially with use of the hand and at night. He has also had neck pains in past.  Pain in hands and up to elbows is achey and throbbing.  Rated 2/10.  He has had symptoms for 1 year.    Small finger is not involved.  Prior treatment used: Wrist splint - yes.  NSAID: - no.  He has had medrol dosepak in January.    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      PAST MEDICAL HISTORY:  Diabetes mellitus negative, hypothyroid negative.    EMG has not been performed.     History reviewed. No pertinent past medical history.    Past Surgical History:   Procedure Laterality Date     C TIBIA FX.BRACE;SEMIRIGID  2004    Fractured right tibia, fixator gregory     HC REMOVE FOREIGN BODY SIMPLE  01/10/06    Right hand     LAP VENTRAL HERNIA REPAIR  8/19/2010    open procedure       Family History   Problem Relation Age of Onset     Diabetes Maternal Grandfather      Diabetes Paternal Grandfather        Social History     Socioeconomic History     Marital status:      Spouse name: Not on file     Number of children: Not on file     Years of education: Not on file     Highest education level: Not on file   Occupational History     Occupation: Wood worker     Employer: [employer]   Tobacco Use     Smoking status: Current Every Day Smoker     Packs/day: 0.50     Years: 20.00     Pack years: 10.00     Types: Cigarettes     Smokeless tobacco: Former User     Tobacco comment: started age 16   Substance and Sexual Activity     Alcohol use: Yes     Alcohol/week: 0.0 standard drinks     Comment: not very often     Drug use: No     Sexual activity: Yes     Partners: Female     Birth control/protection: Condom   Other Topics Concern     Parent/sibling w/ CABG, MI or angioplasty before 65F 55M? Not Asked   Social History  "Narrative     Not on file     Social Determinants of Health     Financial Resource Strain: Not on file   Food Insecurity: Not on file   Transportation Needs: Not on file   Physical Activity: Not on file   Stress: Not on file   Social Connections: Not on file   Intimate Partner Violence: Not on file   Housing Stability: Not on file       Current Outpatient Medications   Medication Sig Dispense Refill     cyclobenzaprine (FLEXERIL) 10 MG tablet Take 1 tablet (10 mg) by mouth 3 times daily as needed for muscle spasms 21 tablet 0     methylPREDNISolone (MEDROL DOSEPAK) 4 MG tablet therapy pack Follow Package Directions 21 tablet 0       Allergies   Allergen Reactions     No Known Allergies        REVIEW OF SYSTEMS:  CONSTITUTIONAL:  NEGATIVE for fever, chills, change in weight, not feeling tired  SKIN:  NEGATIVE for worrisome rashes, no skin lumps, no skin ulcers and no non-healing wounds  EYES:  NEGATIVE for vision changes or irritation.  ENT/MOUTH:  NEGATIVE.  No hearing loss, no hoarseness, no difficulty swallowing.  RESP:  NEGATIVE. No cough or shortness of breath.  BREAST:  NEGATIVE for masses, tenderness or discharge  CV:  NEGATIVE for chest pain, palpitations or peripheral edema  GI:  NEGATIVE for nausea, abdominal pain, heartburn, or change in bowel habits  :  Negative. No dysuria, no hematuria  MUSCULOSKELETAL:  See HPI above  NEURO:  NEGATIVE . No headaches, no dizziness,  no numbness  ENDOCRINE:  NEGATIVE for temperature intolerance, skin/hair changes  HEME/ALLERGY/IMMUNE:  NEGATIVE for bleeding problems  PSYCHIATRIC:  NEGATIVE. no anxiety, no depression.          O: He appears well,   Vitals: /77   Resp 18   Ht 1.854 m (6' 1\")   Wt 70.3 kg (155 lb)   BMI 20.45 kg/m    BMI= Body mass index is 20.45 kg/m .   Cervical spine has full range of motion without pain.  Full range of motion of shoulder, elbows, wrists and fingers.  Hand exam revealed     Right: reduced sensation along median nerve " distribution, + Phalen's maneuver, equivocal Tinel's sign, no thenar atrophy, no weakness of thumb/pinky pincer grasp.  Left: reduced sensation along median nerve distribution, + Phalen's maneuver, equivocal Tinel's sign, no thenar atrophy, no weakness of thumb/pinky pincer grasp   strength: Right normal, Left normal.      A: Likely Bilateral carpal tunnel syndrome.    Also has had cervical involvement.    P: Explanation of median nerve entrapment is given.  The treatment spectrum is discussed, including conservative treatment with night splint, NSAID, activity modification, and possible surgical release if the symptoms are persistent and severe.     Bilateral EMG ordered.  Return to clinic after EMG.

## 2022-06-07 ENCOUNTER — TRANSFERRED RECORDS (OUTPATIENT)
Dept: HEALTH INFORMATION MANAGEMENT | Facility: CLINIC | Age: 42
End: 2022-06-07

## 2022-06-16 ENCOUNTER — HOSPITAL ENCOUNTER (EMERGENCY)
Facility: CLINIC | Age: 42
Discharge: HOME | End: 2022-06-16
Attending: FAMILY MEDICINE | Admitting: FAMILY MEDICINE
Payer: COMMERCIAL

## 2022-06-16 VITALS
SYSTOLIC BLOOD PRESSURE: 142 MMHG | DIASTOLIC BLOOD PRESSURE: 89 MMHG | WEIGHT: 155 LBS | HEART RATE: 76 BPM | RESPIRATION RATE: 20 BRPM | HEIGHT: 72 IN | TEMPERATURE: 97.9 F | OXYGEN SATURATION: 99 % | BODY MASS INDEX: 20.99 KG/M2

## 2022-06-16 DIAGNOSIS — K04.7 DENTAL ABSCESS: ICD-10-CM

## 2022-06-16 DIAGNOSIS — K08.89 PAIN, DENTAL: ICD-10-CM

## 2022-06-16 PROCEDURE — 99284 EMERGENCY DEPT VISIT MOD MDM: CPT | Performed by: FAMILY MEDICINE

## 2022-06-16 PROCEDURE — 99283 EMERGENCY DEPT VISIT LOW MDM: CPT | Performed by: FAMILY MEDICINE

## 2022-06-16 PROCEDURE — 250N000013 HC RX MED GY IP 250 OP 250 PS 637: Performed by: FAMILY MEDICINE

## 2022-06-16 RX ORDER — CLINDAMYCIN HCL 300 MG
300 CAPSULE ORAL 3 TIMES DAILY
Qty: 21 CAPSULE | Refills: 0 | Status: SHIPPED | OUTPATIENT
Start: 2022-06-16 | End: 2022-06-23

## 2022-06-16 RX ORDER — NAPROXEN 500 MG/1
TABLET ORAL
Qty: 15 TABLET | Refills: 0 | Status: SHIPPED | OUTPATIENT
Start: 2022-06-16

## 2022-06-16 RX ORDER — CLINDAMYCIN HCL 150 MG
300 CAPSULE ORAL ONCE
Status: COMPLETED | OUTPATIENT
Start: 2022-06-16 | End: 2022-06-16

## 2022-06-16 RX ADMIN — CLINDAMYCIN HYDROCHLORIDE 300 MG: 150 CAPSULE ORAL at 02:55

## 2022-06-16 ASSESSMENT — ENCOUNTER SYMPTOMS
FACIAL SWELLING: 1
FEVER: 0
CHILLS: 0

## 2022-06-16 NOTE — ED PROVIDER NOTES
History     Chief Complaint   Patient presents with     Dental Pain     HPI  Barron Angel is a 42 year old male who presents to the ER with concerns about swelling to the left lower gum line and cheek. He has had the swelling for the last 1 month but has had more pain associated with it over the last several days.  His wife is wanting him to get it checked but he is resistant to this point.  He states he came in to get started on a course of antibiotics and he plans to make an appointment with his dentist in the next few days.  He denies desiring anything for pain at this time as he needs to go to work shortly.  He wanted his medications sent to the [facility] in [place], [place].  He states that he is typically otherwise healthy and has no allergies to medications.        I did query the [place] controlled substance database. The patient was not listed as having filled a control prescription in the last year.    Allergies:  Allergies   Allergen Reactions     No Known Allergies        Problem List:    Patient Active Problem List    Diagnosis Date Noted     Bilateral carpal tunnel syndrome 04/23/2022     Priority: Medium     CARDIOVASCULAR SCREENING; LDL GOAL LESS THAN 160 10/31/2010     Priority: Medium        Past Medical History:    No past medical history on file.    Past Surgical History:    Past Surgical History:   Procedure Laterality Date     C TIBIA FX.BRACE;SEMIRIGID  2004    Fractured right tibia, fixator gregory     HC REMOVE FOREIGN BODY SIMPLE  01/10/06    Right hand     LAP VENTRAL HERNIA REPAIR  8/19/2010    open procedure       Family History:    Family History   Problem Relation Age of Onset     Diabetes Maternal Grandfather      Diabetes Paternal Grandfather        Social History:  Marital Status:   [2]  Social History     Tobacco Use     Smoking status: Current Every Day Smoker     Packs/day: 0.50     Years: 20.00     Pack years: 10.00     Types: Cigarettes     Smokeless tobacco:  Former User     Tobacco comment: started age 16   Substance Use Topics     Alcohol use: Yes     Alcohol/week: 0.0 standard drinks     Comment: not very often     Drug use: No        Medications:    cyclobenzaprine (FLEXERIL) 10 MG tablet  methylPREDNISolone (MEDROL DOSEPAK) 4 MG tablet therapy pack          Review of Systems   Constitutional: Negative for chills and fever.   HENT: Positive for dental problem and facial swelling (left lateral lower cheek).    All other systems reviewed and are negative.      Physical Exam   BP: (!) 142/89  Pulse: 76  Temp: 97.9  F (36.6  C)  Resp: 20  Height: 182.9 cm (6')  Weight: 70.3 kg (155 lb)  SpO2: 99 %      Physical Exam  Vitals and nursing note reviewed.   Constitutional:       General: He is not in acute distress.     Appearance: He is not ill-appearing.   HENT:      Head: Normocephalic and atraumatic.      Jaw: Tenderness and swelling present. No trismus, pain on movement or malocclusion.        Mouth/Throat:      Mouth: Mucous membranes are moist.      Pharynx: No posterior oropharyngeal erythema.        Comments: Examination to the oral cavity reveals mild tenderness with palpation to the left lower jaw tooth #19 in the left lower jaw has evidence of a prior filling and there is I fracture of the tooth that appears to be mature and not acute.  Just lateral to that area there is a hardened swelling that is nonfluctuant.  It almost feels like a bony growth on the jaw itself.  There is no significant increase in the patient's pain with palpation over the area.  Swelling not typical of the dental abscess.  Neurological:      Mental Status: He is alert.         ED Course                 Procedures              Critical Care time:  none                   Medications   clindamycin (CLEOCIN) capsule 300 mg (300 mg Oral Given 6/16/22 0255)       Assessments & Plan (with Medical Decision Making)  42-year-old to the ER requesting initiation of antibiotic therapy secondary to left  lower jaw dental pain and swelling.  Patient's exam is atypical for a dental abscess and that the swollen area of swelling is not significantly tender with palpation and extremely hard almost bony like.  Concern for possible growth on the lower jawbone.  Patient is encouraged to follow-up with his dentist as soon as possible.  Given the fractured tooth just above that area we will initiate a course of oral antibiotics.     I have reviewed the nursing notes.    I have reviewed the findings, diagnosis, plan and need for follow up with the patient.       Discharge Medication List as of 6/16/2022  2:54 AM      START taking these medications    Details   clindamycin (CLEOCIN) 300 MG capsule Take 1 capsule (300 mg) by mouth 3 times daily for 7 days, Disp-21 capsule, R-0, E-Prescribe      naproxen (NAPROSYN) 500 MG tablet 1 tablet (500mg) every 8-12 hours as needed for pain. Take with food and do not use with ibuprofen., Disp-15 tablet, R-0, E-Prescribe                I verbally discussed the findings of the evaluation today in the ER. I have verbally discussed with Barron the suggested treatment(s) as described in the discharge instructions and handouts. I have prescribed the above listed medications and instructed him on appropriate use of these medications.      I have verbally suggested he follow-up in his clinic or return to the ER for increased symptoms. See the follow-up recommendations documented  in the after visit summary in this visit's EPIC chart.      Disclaimer: This note consists of words and symbols derived from keyboarding and dictation using voice recognition software.  As a result, there may be errors that have gone undetected.  Please consider this when interpreting information found in this note.      Final diagnoses:   Pain, dental   Dental abscess       6/16/2022   [facility] EMERGENCY DEPT     Guilherme Bernal, DO  06/16/22 9756

## 2022-11-01 ENCOUNTER — OFFICE VISIT (OUTPATIENT)
Dept: ORTHOPEDICS | Facility: CLINIC | Age: 42
End: 2022-11-01
Payer: COMMERCIAL

## 2022-11-01 ENCOUNTER — TELEPHONE (OUTPATIENT)
Dept: ORTHOPEDICS | Facility: CLINIC | Age: 42
End: 2022-11-01

## 2022-11-01 VITALS
SYSTOLIC BLOOD PRESSURE: 134 MMHG | RESPIRATION RATE: 18 BRPM | HEIGHT: 73 IN | HEART RATE: 96 BPM | WEIGHT: 155 LBS | DIASTOLIC BLOOD PRESSURE: 92 MMHG | BODY MASS INDEX: 20.54 KG/M2

## 2022-11-01 DIAGNOSIS — G56.03 BILATERAL CARPAL TUNNEL SYNDROME: ICD-10-CM

## 2022-11-01 DIAGNOSIS — G56.01 CARPAL TUNNEL SYNDROME OF RIGHT WRIST: Primary | ICD-10-CM

## 2022-11-01 PROCEDURE — 99213 OFFICE O/P EST LOW 20 MIN: CPT | Performed by: ORTHOPAEDIC SURGERY

## 2022-11-01 NOTE — PROGRESS NOTES
Follow up bilateral EMGs.  These show bilateral carpal tunnel syndrome, right > left.  Also showed ulnar pathology, but could not localize lesion.  No cervical lesions noted.    He is bothered by numbness in both hands.   He is also significantly bothered by pain up around the right shoulder.  He indicates it mainly near the AC joint.  He has no pain with resisted internal rotation.  He had some pain near the AC joint with resisted external rotation.  No pain with abduction.  He does have pains with neck motion, but no radiation into the arms.    Assessment:  1. Bilateral carpal tunnel syndrome, right > left.  2. Right shoulder pain not clear of cause.    Plan:  We discussed options of conservative versus surgical treatment.  Will proceed with right carpal tunnel release with IV regional block.  Risks, benefits, potential complications and alternatives were discussed.  Will plan left carpal tunnel release at later time.

## 2022-11-01 NOTE — LETTER
11/1/2022         RE: Barron Angel  [address] [facility] [address]        Dear Colleague,    Thank you for referring your patient, Barron Angel, to [facility]. Please see a copy of my visit note below.    Follow up bilateral EMGs.  These show bilateral carpal tunnel syndrome, right > left.  Also showed ulnar pathology, but could not localize lesion.  No cervical lesions noted.    He is bothered by numbness in both hands.   He is also significantly bothered by pain up around the right shoulder.  He indicates it mainly near the AC joint.  He has no pain with resisted internal rotation.  He had some pain near the AC joint with resisted external rotation.  No pain with abduction.  He does have pains with neck motion, but no radiation into the arms.    Assessment:  1. Bilateral carpal tunnel syndrome, right > left.  2. Right shoulder pain not clear of cause.    Plan:  We discussed options of conservative versus surgical treatment.  Will proceed with right carpal tunnel release with IV regional block.  Risks, benefits, potential complications and alternatives were discussed.  Will plan left carpal tunnel release at later time.        Again, thank you for allowing me to participate in the care of your patient.        Sincerely,        Guilherme Mcnulty MD

## 2022-11-01 NOTE — PATIENT INSTRUCTIONS
Barron to follow up with Primary Care provider regarding elevated blood pressure.    Surgery:  right carpal tunnel release.      Preop physical with primary physician is needed within 30 days of the surgery.  Nothing to eat or drink for 8 hours before surgery.  For same day surgery you need a ride.  Someone should stay with you for 12 hours afterward.  Stop blood thinners 5 days before surgery (aspirin, warfarin, anti-inflammatories).    You will need a Covid test before surgery.  They will call you to schedule that.    Surgery schedulers will call you to schedule surgery.    If you don't get a call in the next few days, then call [phone] to schedule your surgery for [place] or [phone] to schedule for [place].

## 2022-11-01 NOTE — TELEPHONE ENCOUNTER
Type of surgery: RELEASE, CARPAL TUNNEL (Right)      Location of surgery: [facility]  Date and time of surgery: 2/24/23  Surgeon: geraldine  Pre-Op Appt Date: 2/10  Post-Op Appt Date: 3/7   Packet sent out: Yes  Pre-cert/Authorization completed:  Not Applicable  Date: na

## 2023-02-10 ENCOUNTER — OFFICE VISIT (OUTPATIENT)
Dept: FAMILY MEDICINE | Facility: CLINIC | Age: 43
End: 2023-02-10
Payer: COMMERCIAL

## 2023-02-10 VITALS
DIASTOLIC BLOOD PRESSURE: 88 MMHG | OXYGEN SATURATION: 100 % | BODY MASS INDEX: 20.76 KG/M2 | TEMPERATURE: 97.7 F | SYSTOLIC BLOOD PRESSURE: 120 MMHG | WEIGHT: 157.38 LBS | HEART RATE: 71 BPM

## 2023-02-10 DIAGNOSIS — G56.03 BILATERAL CARPAL TUNNEL SYNDROME: ICD-10-CM

## 2023-02-10 DIAGNOSIS — F17.200 NICOTINE DEPENDENCE, UNCOMPLICATED, UNSPECIFIED NICOTINE PRODUCT TYPE: ICD-10-CM

## 2023-02-10 DIAGNOSIS — Z01.818 PREOP GENERAL PHYSICAL EXAM: Primary | ICD-10-CM

## 2023-02-10 DIAGNOSIS — Z72.0 TOBACCO ABUSE: ICD-10-CM

## 2023-02-10 PROCEDURE — 99213 OFFICE O/P EST LOW 20 MIN: CPT | Mod: 25 | Performed by: STUDENT IN AN ORGANIZED HEALTH CARE EDUCATION/TRAINING PROGRAM

## 2023-02-10 PROCEDURE — 99406 BEHAV CHNG SMOKING 3-10 MIN: CPT | Performed by: STUDENT IN AN ORGANIZED HEALTH CARE EDUCATION/TRAINING PROGRAM

## 2023-02-10 RX ORDER — NICOTINE 21 MG/24HR
1 PATCH, TRANSDERMAL 24 HOURS TRANSDERMAL EVERY 24 HOURS
Qty: 14 PATCH | Refills: 0 | Status: SHIPPED | OUTPATIENT
Start: 2023-03-24 | End: 2023-04-07

## 2023-02-10 RX ORDER — NICOTINE 21 MG/24HR
1 PATCH, TRANSDERMAL 24 HOURS TRANSDERMAL EVERY 24 HOURS
Qty: 42 PATCH | Refills: 0 | Status: SHIPPED | OUTPATIENT
Start: 2023-02-10 | End: 2023-03-24

## 2023-02-10 NOTE — PATIENT INSTRUCTIONS
For informational purposes only. Not to replace the advice of your health care provider. Copyright   2003,  [place] [employer] [facility]. All rights reserved. Clinically reviewed by Meena Light MD. [employer] [id number] - REV .  Preparing for Your Surgery  Getting started  A nurse will call you to review your health history and instructions. They will give you an arrival time based on your scheduled surgery time. Please be ready to share:    Your doctor's clinic name and phone number    Your medical, surgical, and anesthesia history    A list of allergies and sensitivities    A list of medicines, including herbal treatments and over-the-counter drugs    Whether the patient has a legal guardian (ask how to send us the papers in advance)  Please tell us if you're pregnant--or if there's any chance you might be pregnant. Some surgeries may injure a fetus (unborn baby), so they require a pregnancy test. Surgeries that are safe for a fetus don't always need a test, and you can choose whether to have one.   If you have a child who's having surgery, please ask for a copy of Preparing for Your Child's Surgery.    Preparing for surgery    Within 10 to 30 days of surgery: Have a pre-op exam (sometimes called an H&P, or History and Physical). This can be done at a clinic or pre-operative center.  ? If you're having a , you may not need this exam. Talk to your care team.    At your pre-op exam, talk to your care team about all medicines you take. If you need to stop any medicines before surgery, ask when to start taking them again.  ? We do this for your safety. Many medicines can make you bleed too much during surgery. Some change how well surgery (anesthesia) drugs work.    Call your insurance company to let them know you're having surgery. (If you don't have insurance, call [phone].)    Call your clinic if there's any change in your health. This includes signs of a cold or flu (sore throat, runny nose,  cough, rash, fever). It also includes a scrape or scratch near the surgery site.    If you have questions on the day of surgery, call your hospital or surgery center.  Eating and drinking guidelines  For your safety: Unless your surgeon tells you otherwise, follow the guidelines below.    Eat and drink as usual until 8 hours before you arrive for surgery. After that, no food or milk.    Drink clear liquids until 2 hours before you arrive. These are liquids you can see through, like water, Gatorade, and Propel Water. They also include plain black coffee and tea (no cream or milk), candy, and breath mints. You can spit out gum when you arrive.    If you drink alcohol: Stop drinking it the night before surgery.    If your care team tells you to take medicine on the morning of surgery, it's okay to take it with a sip of water.  Preventing infection    Shower or bathe the night before and morning of your surgery. Follow the instructions your clinic gave you. (If no instructions, use regular soap.)    Don't shave or clip hair near your surgery site. We'll remove the hair if needed.    Don't smoke or vape the morning of surgery. You may chew nicotine gum up to 2 hours before surgery. A nicotine patch is okay.  ? Note: Some surgeries require you to completely quit smoking and nicotine. Check with your surgeon.    Your care team will make every effort to keep you safe from infection. We will:  ? Clean our hands often with soap and water (or an alcohol-based hand rub).  ? Clean the skin at your surgery site with a special soap that kills germs.  ? Give you a special gown to keep you warm. (Cold raises the risk of infection.)  ? Wear special hair covers, masks, gowns and gloves during surgery.  ? Give antibiotic medicine, if prescribed. Not all surgeries need antibiotics.  What to bring on the day of surgery    Photo ID and insurance card    Copy of your health care directive, if you have one    Glasses and hearing aids (bring  cases)  ? You can't wear contacts during surgery    Inhaler and eye drops, if you use them (tell us about these when you arrive)    CPAP machine or breathing device, if you use them    A few personal items, if spending the night    If you have . . .  ? A pacemaker, ICD (cardiac defibrillator) or other implant: Bring the ID card.  ? An implanted stimulator: Bring the remote control.  ? A legal guardian: Bring a copy of the certified (court-stamped) guardianship papers.  Please remove any jewelry, including body piercings. Leave jewelry and other valuables at home.  If you're going home the day of surgery    You must have a responsible adult drive you home. They should stay with you overnight as well.    If you don't have someone to stay with you, and you aren't safe to go home alone, we may keep you overnight. Insurance often won't pay for this.  After surgery  If it's hard to control your pain or you need more pain medicine, please call your surgeon's office.  Questions?   If you have any questions for your care team, list them here: _________________________________________________________________________________________________________________________________________________________________________ ____________________________________ ____________________________________ ____________________________________      How to Quit Smoking  Smoking is a hard habit to break. About 50% of all people who have ever smoked have been able to quit. Most people who still smoke want to quit. Here are some of the best ways to stop smoking.     Keep in mind the health benefits of quitting  The health benefits of quitting start right away. They keep improving the longer you go without smoking. Knowing this can help inspire you to stay on track. These benefits occur at any age. If you are 17 or 70, quitting is a good choice. Some of the health benefits after your last cigarette include:     After 20 minutes: Your blood pressure and  pulse return to normal.    After 8 hours: Your oxygen levels return to normal.    After 2 days: Your ability to smell and taste start to improve as damaged nerves regrow.    After 2 to 3 weeks: Your circulation and lung function improve.    After 1 to 9 months: Your coughing, congestion, and shortness of breath decrease. Your tiredness decreases.    After 1 year: Your risk of heart attack decreases by 50%.    After 5 years: Your risk of lung cancer decreases by 50%. Your risk of stroke becomes the same as a nonsmoker s.  Go cold turkey  Most former smokers quit cold turkey. This means stopping all at once. Trying to cut back slowly often doesn't work as well. This may be because it continues the habit of smoking. Also you may inhale more smoke while smoking fewer cigarettes. This leads to the same amount of nicotine in your body.   Get support  Support programs can be a big help, especially for heavy smokers. These groups offer lectures, ways to change behavior, and peer support. Here are some ways to find a support program:     Free national quitline 800-QUIT-NOW (825-132-6082)    [facility] quit-smoking programs    American Lung Association 958-261-7388    American Cancer Society 751-702-3042  Support at home is important too. Family and friends can offer praise and reassurance. If the smoker in your life finds it hard to quit, encourage them to keep trying.   Try over-the-counter medicine  Nicotine replacement therapy may make it easier to quit. Some aids are available without a prescription. These include a nicotine patch, gum, and lozenges. But it's best to use these under the care of your healthcare provider. The skin patch gives a steady supply of nicotine. Nicotine gum and lozenges give short-time doses of low levels of nicotine. Both methods reduce the craving for cigarettes. If you have nausea, vomiting, dizziness, weakness, or a fast heartbeat, stop using these products. See your provider.   Ask about  prescription medicine  After reviewing your smoking patterns and past attempts to quit, your healthcare provider may offer a prescription medicine such as bupropion, varenicline, a nicotine inhaler, or nasal spray. Each has advantages and side effects. Your provider can review these with you.   Keep trying  Most smokers make many attempts at quitting before they succeed. It s important not to give up.   To learn more  For more on how to quit smoking, try these resources:     www.cdc.gov/tobacco/quit_smoking/ 800-QUIT-NOW (469-743-4447)    www.smokefree.gov 877-44U-QUIT (650-863-9214)    www.lung.org/stop-smoking/ 800-LUNGUSA (475-839-5851)  [employer]Well last reviewed this educational content on 12/1/2019 2000-2021 [employer]. All rights reserved. This information is not intended as a substitute for professional medical care. Always follow your healthcare professional's instructions.            Nicotine Transdermal System   Habitrol, Nicoderm C-Q    Uses  For quitting smoking.    Instructions  DO NOT take this medicine by mouth.    Avoid placing the patch near the breast.    Remove the patch after 24 hours.    Keep the medicine at room temperature. Avoid heat and direct light.    This patch should not be cut.    Wash your hands before and after handling this medicine.    Remove old patch before applying new one. Change the location of the new patch.    If you have vivid dreams or trouble sleeping, you may remove the patch before going to sleep.    Ask your doctor or pharmacist about locations on your body where this patch can be used.    Remove the plastic liner that protects the sticky side of the patch before applying to the skin.    Be sure the area of skin is clean and dry before putting on a new patch.    Apply the patch to a clean, dry, hairless area.    Press the patch firmly for a few seconds to make sure it stays in place.    After removing the patch, fold it together and discard it out of  reach of children and pets.    Please ask your doctor or pharmacist how you can safely dispose of used patches.    If the skin under the patch becomes irritated, remove the patch. Do not apply a new patch to the area until the skin feels better.    To avoid irritating your skin, use a different location for a new patch.    Apply the patch only to normal looking skin. Avoid areas of the skin that are red, have scrapes, or damaged.    If the patch falls off, apply a new a patch on a different location of the body.    Please tell your doctor and pharmacist about all the medicines you take. Include both prescription and over-the-counter medicines. Also tell them about any vitamins, herbal medicines, or anything else you take for your health.    If you need to stop this medicine, your doctor may wish to gradually reduce the dosage before stopping.    Do not use more than 1 patch at any one time.    Cautions  Tell your doctor and pharmacist if you ever had an allergic reaction to a medicine. Symptoms of an allergic reaction can include trouble breathing, skin rash, itching, swelling, or severe dizziness.    Do not use the medication any more than instructed.    Avoid smoking while on this medicine. Smoking may increase your risk for stroke, heart attack, blood clots, high blood pressure, and other diseases of the heart and blood vessels.    Tell the doctor or pharmacist if you are pregnant, planning to be pregnant, or breastfeeding.    Ask your pharmacist if this medicine can interact with any of your other medicines. Be sure to tell them about all the medicines you take.    Please tell all your doctors and dentists that you are on this medicine before they provide care.    Side Effects  The following is a list of some common side effects from this medicine. Please speak with your doctor about what you should do if you experience these or other side effects.      skin irritation where medicine is applied    If you have  any of the following side effects, you may be getting too much medicine. Please contact your doctor to let them know about these side effects.      diarrhea    dizziness    nausea    rapid heartbeat    vomiting    A few people may have an allergic reaction to this medicine. Symptoms can include difficulty breathing, skin rash, itching, swelling, or severe dizziness. If you notice any of these symptoms, seek medical help quickly.    Extra  Please speak with your doctor, nurse, or pharmacist if you have any questions about this medicine.      [email or web address].[email or web address]  IMPORTANT NOTE: This document tells you briefly how to take your medicine, but it does not tell you all there is to know about it. Your doctor or pharmacist may give you other documents about your medicine. Please talk to them if you have any questions. Always follow their advice. There is a more complete description of this medicine available in English. Scan this code on your smartphone or tablet or use the web address below. You can also ask your pharmacist for a printout. If you have any questions, please ask your pharmacist.   2021 [employer].      4873-3492 [employer]. All rights reserved. This information is not intended as a substitute for professional medical care. Always follow your healthcare professional's instructions.

## 2023-02-10 NOTE — H&P (VIEW-ONLY)
[address] [address]  Phone: [phone]  Fax: [phone]  Primary Provider: Monik Hemphill  Pre-op Performing Provider: DANIEL MCCALL      PREOPERATIVE EVALUATION:  Today's date: 2/10/2023    Barron Angel is a 42 year old male who presents for a preoperative evaluation.    Surgical Information:  Surgery/Procedure: release carpal tunnel  Surgery Location: [facility]  Surgeon: Pricila  Surgery Date: 2/24/23  Time of Surgery: 1130  Where patient plans to recover: At home with family  Fax number for surgical facility: Note does not need to be faxed, will be available electronically in Epic.    Type of Anesthesia Anticipated: Local with MAC    Assessment & Plan     The proposed surgical procedure is considered INTERMEDIATE risk.    Preop general physical exam    Bilateral carpal tunnel syndrome    Tobacco abuse    Nicotine dependence, uncomplicated, unspecified nicotine product type  - [place] Quit Partner Referral  - SMOKING CESSATION COUNSELING 3-10 MIN  - nicotine (NICODERM CQ) 21 MG/24HR 24 hr patch  Dispense: 42 patch; Refill: 0  - nicotine (NICODERM CQ) 14 MG/24HR 24 hr patch  Dispense: 14 patch; Refill: 0  - nicotine (NICODERM CQ) 7 MG/24HR 24 hr patch  Dispense: 14 patch; Refill: 0             Risks and Recommendations:  The patient has the following additional risks and recommendations for perioperative complications:   - No identified additional risk factors other than previously addressed    Medication Instructions:  Patient is on no chronic medications    RECOMMENDATION:  APPROVAL GIVEN to proceed with proposed procedure, without further diagnostic evaluation.      Subjective     HPI related to upcoming procedure: carpal tunnel surgery    Preop Questions 2/10/2023   1. Have you ever had a heart attack or stroke? No   2. Have you ever had surgery on your heart or blood vessels, such as a stent placement, a  coronary artery bypass, or surgery on an artery in your head, neck, heart, or legs? No   3. Do you have chest pain with activity? No   4. Do you have a history of  heart failure? No   5. Do you currently have a cold, bronchitis or symptoms of other infection? No   6. Do you have a cough, shortness of breath, or wheezing? No   7. Do you or anyone in your family have previous history of blood clots? No   8. Do you or does anyone in your family have a serious bleeding problem such as prolonged bleeding following surgeries or cuts? No   9. Have you ever had problems with anemia or been told to take iron pills? No   10. Have you had any abnormal blood loss such as black, tarry or bloody stools? No   11. Have you ever had a blood transfusion? No   12. Are you willing to have a blood transfusion if it is medically needed before, during, or after your surgery? Yes   13. Have you or any of your relatives ever had problems with anesthesia? No   14. Do you have sleep apnea, excessive snoring or daytime drowsiness? No   15. Do you have any artifical heart valves or other implanted medical devices like a pacemaker, defibrillator, or continuous glucose monitor? No   16. Do you have artificial joints? No   17. Are you allergic to latex? No        Health Care Directive:  Patient does not have a Health Care Directive or Living Will: Discussed advance care planning with patient; information given to patient to review.    Preoperative Review of :   reviewed - no record of controlled substances prescribed.        Review of Systems  Constitutional, neuro, ENT, endocrine, pulmonary, cardiac, gastrointestinal, genitourinary, musculoskeletal, integument and psychiatric systems are negative, except as otherwise noted.    Patient Active Problem List    Diagnosis Date Noted     Bilateral carpal tunnel syndrome 04/23/2022     Priority: Medium     CARDIOVASCULAR SCREENING; LDL GOAL LESS THAN 160 10/31/2010     Priority: Medium      No  past medical history on file.  Past Surgical History:   Procedure Laterality Date     C TIBIA FX.BRACE;SEMIRIGID  2004    Fractured right tibia, fixator gregory     HC REMOVE FOREIGN BODY SIMPLE  01/10/06    Right hand     LAP VENTRAL HERNIA REPAIR  8/19/2010    open procedure     Current Outpatient Medications   Medication Sig Dispense Refill     [START ON 3/24/2023] nicotine (NICODERM CQ) 14 MG/24HR 24 hr patch Place 1 patch onto the skin every 24 hours for 14 days 14 patch 0     nicotine (NICODERM CQ) 21 MG/24HR 24 hr patch Place 1 patch onto the skin every 24 hours for 42 days 42 patch 0     [START ON 4/8/2023] nicotine (NICODERM CQ) 7 MG/24HR 24 hr patch Place 1 patch onto the skin every 24 hours for 14 days 14 patch 0     naproxen (NAPROSYN) 500 MG tablet 1 tablet (500mg) every 8-12 hours as needed for pain. Take with food and do not use with ibuprofen. (Patient not taking: Reported on 2/10/2023) 15 tablet 0       Allergies   Allergen Reactions     No Known Allergies         Social History     Tobacco Use     Smoking status: Every Day     Packs/day: 0.50     Years: 20.00     Pack years: 10.00     Types: Cigarettes     Smokeless tobacco: Former     Tobacco comments:     started age 16   Substance Use Topics     Alcohol use: Yes     Alcohol/week: 0.0 standard drinks     Comment: not very often     Family History   Problem Relation Age of Onset     Diabetes Maternal Grandfather      Diabetes Paternal Grandfather      History   Drug Use No         Objective     /88   Pulse 71   Temp 97.7  F (36.5  C) (Temporal)   Wt 71.4 kg (157 lb 6 oz)   SpO2 100%   BMI 20.76 kg/m      Physical Exam    GENERAL APPEARANCE: healthy, alert and no distress     EYES: EOMI,  PERRL     HENT: ear canals and TM's normal and nose and mouth without ulcers or lesions     NECK: no adenopathy, no asymmetry, masses, or scars and thyroid normal to palpation     RESP: lungs clear to auscultation - no rales, rhonchi or wheezes     CV:  regular rates and rhythm, normal S1 S2, no S3 or S4 and no murmur, click or rub     ABDOMEN:  soft, nontender, no HSM or masses and bowel sounds normal     MS: extremities normal- no gross deformities noted, no evidence of inflammation in joints, FROM in all extremities.     SKIN: no suspicious lesions or rashes     NEURO: Normal strength and tone, sensory exam grossly normal, mentation intact and speech normal     PSYCH: mentation appears normal. and affect normal/bright     LYMPHATICS: No cervical adenopathy    No results for input(s): HGB, PLT, INR, NA, POTASSIUM, CR, A1C in the last 35724 hours.     Diagnostics:  No labs were ordered during this visit.   No EKG this visit, completed in the last 90 days.    Revised Cardiac Risk Index (RCRI):  The patient has the following serious cardiovascular risks for perioperative complications:   - No serious cardiac risks = 0 points     RCRI Interpretation: 0 points: Class I (very low risk - 0.4% complication rate)           Signed Electronically by: Prince Suazo MD  Copy of this evaluation report is provided to requesting physician.

## 2023-02-10 NOTE — PROGRESS NOTES
[address] [address]  Phone: [phone]  Fax: [phone]  Primary Provider: Monik Hemphill  Pre-op Performing Provider: DANIEL MCCALL      PREOPERATIVE EVALUATION:  Today's date: 2/10/2023    Barron Angel is a 42 year old male who presents for a preoperative evaluation.    Surgical Information:  Surgery/Procedure: release carpal tunnel  Surgery Location: [facility]  Surgeon: Pricila  Surgery Date: 2/24/23  Time of Surgery: 1130  Where patient plans to recover: At home with family  Fax number for surgical facility: Note does not need to be faxed, will be available electronically in Epic.    Type of Anesthesia Anticipated: Local with MAC    Assessment & Plan     The proposed surgical procedure is considered INTERMEDIATE risk.    Preop general physical exam    Bilateral carpal tunnel syndrome    Tobacco abuse    Nicotine dependence, uncomplicated, unspecified nicotine product type  - [place] Quit Partner Referral  - SMOKING CESSATION COUNSELING 3-10 MIN  - nicotine (NICODERM CQ) 21 MG/24HR 24 hr patch  Dispense: 42 patch; Refill: 0  - nicotine (NICODERM CQ) 14 MG/24HR 24 hr patch  Dispense: 14 patch; Refill: 0  - nicotine (NICODERM CQ) 7 MG/24HR 24 hr patch  Dispense: 14 patch; Refill: 0             Risks and Recommendations:  The patient has the following additional risks and recommendations for perioperative complications:   - No identified additional risk factors other than previously addressed    Medication Instructions:  Patient is on no chronic medications    RECOMMENDATION:  APPROVAL GIVEN to proceed with proposed procedure, without further diagnostic evaluation.      Subjective     HPI related to upcoming procedure: carpal tunnel surgery    Preop Questions 2/10/2023   1. Have you ever had a heart attack or stroke? No   2. Have you ever had surgery on your heart or blood vessels, such as a stent placement, a  coronary artery bypass, or surgery on an artery in your head, neck, heart, or legs? No   3. Do you have chest pain with activity? No   4. Do you have a history of  heart failure? No   5. Do you currently have a cold, bronchitis or symptoms of other infection? No   6. Do you have a cough, shortness of breath, or wheezing? No   7. Do you or anyone in your family have previous history of blood clots? No   8. Do you or does anyone in your family have a serious bleeding problem such as prolonged bleeding following surgeries or cuts? No   9. Have you ever had problems with anemia or been told to take iron pills? No   10. Have you had any abnormal blood loss such as black, tarry or bloody stools? No   11. Have you ever had a blood transfusion? No   12. Are you willing to have a blood transfusion if it is medically needed before, during, or after your surgery? Yes   13. Have you or any of your relatives ever had problems with anesthesia? No   14. Do you have sleep apnea, excessive snoring or daytime drowsiness? No   15. Do you have any artifical heart valves or other implanted medical devices like a pacemaker, defibrillator, or continuous glucose monitor? No   16. Do you have artificial joints? No   17. Are you allergic to latex? No        Health Care Directive:  Patient does not have a Health Care Directive or Living Will: Discussed advance care planning with patient; information given to patient to review.    Preoperative Review of :   reviewed - no record of controlled substances prescribed.        Review of Systems  Constitutional, neuro, ENT, endocrine, pulmonary, cardiac, gastrointestinal, genitourinary, musculoskeletal, integument and psychiatric systems are negative, except as otherwise noted.    Patient Active Problem List    Diagnosis Date Noted     Bilateral carpal tunnel syndrome 04/23/2022     Priority: Medium     CARDIOVASCULAR SCREENING; LDL GOAL LESS THAN 160 10/31/2010     Priority: Medium      No  past medical history on file.  Past Surgical History:   Procedure Laterality Date     C TIBIA FX.BRACE;SEMIRIGID  2004    Fractured right tibia, fixator gregory     HC REMOVE FOREIGN BODY SIMPLE  01/10/06    Right hand     LAP VENTRAL HERNIA REPAIR  8/19/2010    open procedure     Current Outpatient Medications   Medication Sig Dispense Refill     [START ON 3/24/2023] nicotine (NICODERM CQ) 14 MG/24HR 24 hr patch Place 1 patch onto the skin every 24 hours for 14 days 14 patch 0     nicotine (NICODERM CQ) 21 MG/24HR 24 hr patch Place 1 patch onto the skin every 24 hours for 42 days 42 patch 0     [START ON 4/8/2023] nicotine (NICODERM CQ) 7 MG/24HR 24 hr patch Place 1 patch onto the skin every 24 hours for 14 days 14 patch 0     naproxen (NAPROSYN) 500 MG tablet 1 tablet (500mg) every 8-12 hours as needed for pain. Take with food and do not use with ibuprofen. (Patient not taking: Reported on 2/10/2023) 15 tablet 0       Allergies   Allergen Reactions     No Known Allergies         Social History     Tobacco Use     Smoking status: Every Day     Packs/day: 0.50     Years: 20.00     Pack years: 10.00     Types: Cigarettes     Smokeless tobacco: Former     Tobacco comments:     started age 16   Substance Use Topics     Alcohol use: Yes     Alcohol/week: 0.0 standard drinks     Comment: not very often     Family History   Problem Relation Age of Onset     Diabetes Maternal Grandfather      Diabetes Paternal Grandfather      History   Drug Use No         Objective     /88   Pulse 71   Temp 97.7  F (36.5  C) (Temporal)   Wt 71.4 kg (157 lb 6 oz)   SpO2 100%   BMI 20.76 kg/m      Physical Exam    GENERAL APPEARANCE: healthy, alert and no distress     EYES: EOMI,  PERRL     HENT: ear canals and TM's normal and nose and mouth without ulcers or lesions     NECK: no adenopathy, no asymmetry, masses, or scars and thyroid normal to palpation     RESP: lungs clear to auscultation - no rales, rhonchi or wheezes     CV:  regular rates and rhythm, normal S1 S2, no S3 or S4 and no murmur, click or rub     ABDOMEN:  soft, nontender, no HSM or masses and bowel sounds normal     MS: extremities normal- no gross deformities noted, no evidence of inflammation in joints, FROM in all extremities.     SKIN: no suspicious lesions or rashes     NEURO: Normal strength and tone, sensory exam grossly normal, mentation intact and speech normal     PSYCH: mentation appears normal. and affect normal/bright     LYMPHATICS: No cervical adenopathy    No results for input(s): HGB, PLT, INR, NA, POTASSIUM, CR, A1C in the last 97314 hours.     Diagnostics:  No labs were ordered during this visit.   No EKG this visit, completed in the last 90 days.    Revised Cardiac Risk Index (RCRI):  The patient has the following serious cardiovascular risks for perioperative complications:   - No serious cardiac risks = 0 points     RCRI Interpretation: 0 points: Class I (very low risk - 0.4% complication rate)           Signed Electronically by: Prince Suazo MD  Copy of this evaluation report is provided to requesting physician.

## 2023-02-17 PROBLEM — G56.01 CARPAL TUNNEL SYNDROME ON RIGHT: Status: ACTIVE | Noted: 2023-02-17

## 2023-02-24 ENCOUNTER — ANESTHESIA EVENT (OUTPATIENT)
Dept: SURGERY | Facility: CLINIC | Age: 43
End: 2023-02-24
Payer: COMMERCIAL

## 2023-02-24 ENCOUNTER — HOSPITAL ENCOUNTER (OUTPATIENT)
Facility: CLINIC | Age: 43
Discharge: HOME | End: 2023-02-24
Attending: ORTHOPAEDIC SURGERY | Admitting: ORTHOPAEDIC SURGERY
Payer: COMMERCIAL

## 2023-02-24 ENCOUNTER — ANESTHESIA (OUTPATIENT)
Dept: SURGERY | Facility: CLINIC | Age: 43
End: 2023-02-24
Payer: COMMERCIAL

## 2023-02-24 VITALS
WEIGHT: 157.38 LBS | BODY MASS INDEX: 20.76 KG/M2 | HEART RATE: 73 BPM | OXYGEN SATURATION: 100 % | TEMPERATURE: 97.9 F | SYSTOLIC BLOOD PRESSURE: 121 MMHG | DIASTOLIC BLOOD PRESSURE: 95 MMHG | RESPIRATION RATE: 17 BRPM

## 2023-02-24 DIAGNOSIS — G56.01 CARPAL TUNNEL SYNDROME ON RIGHT: Primary | ICD-10-CM

## 2023-02-24 PROCEDURE — 250N000011 HC RX IP 250 OP 636: Performed by: ORTHOPAEDIC SURGERY

## 2023-02-24 PROCEDURE — 370N000017 HC ANESTHESIA TECHNICAL FEE, PER MIN: Performed by: ORTHOPAEDIC SURGERY

## 2023-02-24 PROCEDURE — 999N000141 HC STATISTIC PRE-PROCEDURE NURSING ASSESSMENT: Performed by: ORTHOPAEDIC SURGERY

## 2023-02-24 PROCEDURE — 250N000011 HC RX IP 250 OP 636: Performed by: NURSE ANESTHETIST, CERTIFIED REGISTERED

## 2023-02-24 PROCEDURE — 258N000003 HC RX IP 258 OP 636: Performed by: NURSE ANESTHETIST, CERTIFIED REGISTERED

## 2023-02-24 PROCEDURE — 710N000012 HC RECOVERY PHASE 2, PER MINUTE: Performed by: ORTHOPAEDIC SURGERY

## 2023-02-24 PROCEDURE — 64721 CARPAL TUNNEL SURGERY: CPT | Mod: RT | Performed by: ORTHOPAEDIC SURGERY

## 2023-02-24 PROCEDURE — 360N000075 HC SURGERY LEVEL 2, PER MIN: Performed by: ORTHOPAEDIC SURGERY

## 2023-02-24 PROCEDURE — 250N000009 HC RX 250: Performed by: NURSE ANESTHETIST, CERTIFIED REGISTERED

## 2023-02-24 PROCEDURE — 272N000001 HC OR GENERAL SUPPLY STERILE: Performed by: ORTHOPAEDIC SURGERY

## 2023-02-24 RX ORDER — BUPIVACAINE HYDROCHLORIDE 2.5 MG/ML
INJECTION, SOLUTION INFILTRATION; PERINEURAL PRN
Status: DISCONTINUED | OUTPATIENT
Start: 2023-02-24 | End: 2023-02-24 | Stop reason: HOSPADM

## 2023-02-24 RX ORDER — LIDOCAINE 40 MG/G
CREAM TOPICAL
Status: DISCONTINUED | OUTPATIENT
Start: 2023-02-24 | End: 2023-02-24 | Stop reason: HOSPADM

## 2023-02-24 RX ORDER — CEFAZOLIN SODIUM/WATER 2 G/20 ML
2 SYRINGE (ML) INTRAVENOUS SEE ADMIN INSTRUCTIONS
Status: DISCONTINUED | OUTPATIENT
Start: 2023-02-24 | End: 2023-02-24 | Stop reason: HOSPADM

## 2023-02-24 RX ORDER — LIDOCAINE HYDROCHLORIDE 10 MG/ML
INJECTION, SOLUTION INFILTRATION; PERINEURAL PRN
Status: DISCONTINUED | OUTPATIENT
Start: 2023-02-24 | End: 2023-02-24

## 2023-02-24 RX ORDER — PROPOFOL 10 MG/ML
INJECTION, EMULSION INTRAVENOUS PRN
Status: DISCONTINUED | OUTPATIENT
Start: 2023-02-24 | End: 2023-02-24

## 2023-02-24 RX ORDER — PROPOFOL 10 MG/ML
INJECTION, EMULSION INTRAVENOUS CONTINUOUS PRN
Status: DISCONTINUED | OUTPATIENT
Start: 2023-02-24 | End: 2023-02-24

## 2023-02-24 RX ORDER — LIDOCAINE HYDROCHLORIDE 5 MG/ML
INJECTION, SOLUTION INFILTRATION; PERINEURAL PRN
Status: DISCONTINUED | OUTPATIENT
Start: 2023-02-24 | End: 2023-02-24

## 2023-02-24 RX ORDER — ONDANSETRON 2 MG/ML
INJECTION INTRAMUSCULAR; INTRAVENOUS PRN
Status: DISCONTINUED | OUTPATIENT
Start: 2023-02-24 | End: 2023-02-24

## 2023-02-24 RX ORDER — FENTANYL CITRATE 50 UG/ML
25 INJECTION, SOLUTION INTRAMUSCULAR; INTRAVENOUS
Status: DISCONTINUED | OUTPATIENT
Start: 2023-02-24 | End: 2023-02-24 | Stop reason: HOSPADM

## 2023-02-24 RX ORDER — OXYCODONE HYDROCHLORIDE 5 MG/1
5 TABLET ORAL
Status: DISCONTINUED | OUTPATIENT
Start: 2023-02-24 | End: 2023-02-24 | Stop reason: HOSPADM

## 2023-02-24 RX ORDER — OXYCODONE HYDROCHLORIDE 5 MG/1
10 TABLET ORAL
Status: DISCONTINUED | OUTPATIENT
Start: 2023-02-24 | End: 2023-02-24 | Stop reason: HOSPADM

## 2023-02-24 RX ORDER — HYDROCODONE BITARTRATE AND ACETAMINOPHEN 5; 325 MG/1; MG/1
1-2 TABLET ORAL EVERY 4 HOURS PRN
Qty: 10 TABLET | Refills: 0 | Status: SHIPPED | OUTPATIENT
Start: 2023-02-24

## 2023-02-24 RX ORDER — DEXAMETHASONE SODIUM PHOSPHATE 10 MG/ML
INJECTION, SOLUTION INTRAMUSCULAR; INTRAVENOUS PRN
Status: DISCONTINUED | OUTPATIENT
Start: 2023-02-24 | End: 2023-02-24

## 2023-02-24 RX ORDER — CEFAZOLIN SODIUM/WATER 2 G/20 ML
2 SYRINGE (ML) INTRAVENOUS
Status: COMPLETED | OUTPATIENT
Start: 2023-02-24 | End: 2023-02-24

## 2023-02-24 RX ORDER — ACETAMINOPHEN 325 MG/1
975 TABLET ORAL ONCE
Status: DISCONTINUED | OUTPATIENT
Start: 2023-02-24 | End: 2023-02-24 | Stop reason: HOSPADM

## 2023-02-24 RX ORDER — FENTANYL CITRATE 50 UG/ML
INJECTION, SOLUTION INTRAMUSCULAR; INTRAVENOUS PRN
Status: DISCONTINUED | OUTPATIENT
Start: 2023-02-24 | End: 2023-02-24

## 2023-02-24 RX ORDER — SODIUM CHLORIDE, SODIUM LACTATE, POTASSIUM CHLORIDE, CALCIUM CHLORIDE 600; 310; 30; 20 MG/100ML; MG/100ML; MG/100ML; MG/100ML
INJECTION, SOLUTION INTRAVENOUS CONTINUOUS
Status: DISCONTINUED | OUTPATIENT
Start: 2023-02-24 | End: 2023-02-24 | Stop reason: HOSPADM

## 2023-02-24 RX ADMIN — PROPOFOL 50 MG: 10 INJECTION, EMULSION INTRAVENOUS at 11:42

## 2023-02-24 RX ADMIN — DEXAMETHASONE SODIUM PHOSPHATE 2 MG: 10 INJECTION, SOLUTION INTRAMUSCULAR; INTRAVENOUS at 11:37

## 2023-02-24 RX ADMIN — ONDANSETRON 4 MG: 2 INJECTION INTRAMUSCULAR; INTRAVENOUS at 11:48

## 2023-02-24 RX ADMIN — PROPOFOL 200 MCG/KG/MIN: 10 INJECTION, EMULSION INTRAVENOUS at 11:43

## 2023-02-24 RX ADMIN — DEXAMETHASONE SODIUM PHOSPHATE 2 MG: 10 INJECTION, SOLUTION INTRAMUSCULAR; INTRAVENOUS at 11:35

## 2023-02-24 RX ADMIN — Medication 2 G: at 11:28

## 2023-02-24 RX ADMIN — LIDOCAINE HYDROCHLORIDE 20 MG: 10 INJECTION, SOLUTION INFILTRATION; PERINEURAL at 11:46

## 2023-02-24 RX ADMIN — Medication 2 G: at 11:27

## 2023-02-24 RX ADMIN — LIDOCAINE HYDROCHLORIDE 50 ML: 5 INJECTION, SOLUTION INFILTRATION; PERINEURAL at 11:43

## 2023-02-24 RX ADMIN — DEXAMETHASONE SODIUM PHOSPHATE 2 MG: 10 INJECTION, SOLUTION INTRAMUSCULAR; INTRAVENOUS at 11:36

## 2023-02-24 RX ADMIN — FENTANYL CITRATE 25 MCG: 50 INJECTION, SOLUTION INTRAMUSCULAR; INTRAVENOUS at 12:01

## 2023-02-24 RX ADMIN — DEXAMETHASONE SODIUM PHOSPHATE 2 MG: 10 INJECTION, SOLUTION INTRAMUSCULAR; INTRAVENOUS at 11:34

## 2023-02-24 RX ADMIN — FENTANYL CITRATE 25 MCG: 50 INJECTION, SOLUTION INTRAMUSCULAR; INTRAVENOUS at 11:48

## 2023-02-24 RX ADMIN — DEXAMETHASONE SODIUM PHOSPHATE 2 MG: 10 INJECTION, SOLUTION INTRAMUSCULAR; INTRAVENOUS at 11:38

## 2023-02-24 RX ADMIN — SODIUM CHLORIDE, POTASSIUM CHLORIDE, SODIUM LACTATE AND CALCIUM CHLORIDE: 600; 310; 30; 20 INJECTION, SOLUTION INTRAVENOUS at 11:28

## 2023-02-24 RX ADMIN — MIDAZOLAM 2 MG: 1 INJECTION INTRAMUSCULAR; INTRAVENOUS at 11:27

## 2023-02-24 ASSESSMENT — ACTIVITIES OF DAILY LIVING (ADL)
ADLS_ACUITY_SCORE: 35
ADLS_ACUITY_SCORE: 35

## 2023-02-24 ASSESSMENT — LIFESTYLE VARIABLES: TOBACCO_USE: 1

## 2023-02-24 NOTE — DISCHARGE INSTRUCTIONS
[facility] Same-Day [facility]   Adult Discharge Orders & Instructions     For 24 hours after surgery    Get plenty of rest.  A responsible adult must stay with you for at least 24 hours after you leave the hospital.   Do not drive or use heavy equipment.  If you have weakness or tingling, don't drive or use heavy equipment until this feeling goes away.  Do not drink alcohol.  Avoid strenuous or risky activities.  Ask for help when climbing stairs.   You may feel lightheaded.  If so, sit for a few minutes before standing.  Have someone help you get up.   You may have a slight fever. Call the doctor if your fever is over 100 F (37.7 C) (taken under the tongue) or lasts longer than 24 hours.  You may have a dry mouth, a sore throat, muscle aches or trouble sleeping.  These should go away after 24 hours.  Do not make important or legal decisions.  We don t expect you to have any problems from the surgery or treatment you had today. Just in case, here s what to do if you have pain, upset stomach (nausea), bleeding or infection:  Pain:  Take medicines your doctor has prescribed or over-the-counter medicine they have suggested. Resting and using ice packs can help, too. For surgery on an arm or leg, raise it on a pillow to ease swelling. Call your doctor if these methods don t work.  Copyright [employer], Licensed under [employer]  Upset stomach (nausea):  Take anti-nausea medicine approved by your doctor. Drink clear liquids like apple juice, ginger ale, broth or 7-Up. Be sure to drink enough fluids. Rest can help, too. Move to normal foods when you re ready. Bleeding:  In the first 24 hours, you may see a little blood on your dressing, about the size of a quarter. You don t need to worry about this much blood, but if the blood spot keeps getting bigger:  Put pressure on the wound if you can, AND  Call your doctor.  Copyright [place], Licensed under [employer]  Fever/Infection: Please call  your doctor if you have any of these signs:  Redness  Swelling  Wound feels warm  Pain gets worse  Bad-smelling fluid leaks from wound  Fever or chills  Call your doctor for any of the followin.  It has been over 8 to 10 hours since surgery and you are still not able to urinate (pass water).    2.  Headache for over 24 hours.    3.  Numbness, tingling or weakness in your legs the day after surgery (if you had spinal anesthesia).    Nurse advice line: [phone]    Pt to meet >75% of estimated nutrient needs during hospitalization

## 2023-02-24 NOTE — ANESTHESIA PREPROCEDURE EVALUATION
Anesthesia Pre-Procedure Evaluation    Patient: Barron Angel   MRN: [id number] : 1980        Procedure : Procedure(s):  RELEASE, CARPAL TUNNEL          History reviewed. No pertinent past medical history.   Past Surgical History:   Procedure Laterality Date     C TIBIA FX.BRACE;SEMIRIGID      Fractured right tibia, fixator gregory     HC REMOVE FOREIGN BODY SIMPLE  01/10/06    Right hand     LAP VENTRAL HERNIA REPAIR  2010    open procedure      Allergies   Allergen Reactions     No Known Allergies       Social History     Tobacco Use     Smoking status: Every Day     Packs/day: 0.50     Years: 20.00     Pack years: 10.00     Types: Cigarettes     Smokeless tobacco: Former     Tobacco comments:     started age 16   Substance Use Topics     Alcohol use: Yes     Alcohol/week: 0.0 standard drinks     Comment: not very often      Wt Readings from Last 1 Encounters:   02/10/23 71.4 kg (157 lb 6 oz)        Anesthesia Evaluation   Pt has had prior anesthetic. Type: MAC and General.    No history of anesthetic complications       ROS/MED HX  ENT/Pulmonary:     (+) tobacco use, Current use,     Neurologic:  - neg neurologic ROS     Cardiovascular:  - neg cardiovascular ROS     METS/Exercise Tolerance: >4 METS    Hematologic:  - neg hematologic  ROS     Musculoskeletal:  - neg musculoskeletal ROS     GI/Hepatic:  - neg GI/hepatic ROS     Renal/Genitourinary:  - neg Renal ROS     Endo:  - neg endo ROS     Psychiatric/Substance Use:  - neg psychiatric ROS     Infectious Disease:  - neg infectious disease ROS     Malignancy:  - neg malignancy ROS     Other:  - neg other ROS          Physical Exam    Airway  airway exam normal      Mallampati: II   TM distance: > 3 FB   Neck ROM: full   Mouth opening: > 3 cm    Respiratory Devices and Support         Dental           Cardiovascular   cardiovascular exam normal       Rhythm and rate: regular and normal     Pulmonary   pulmonary exam normal        breath sounds  clear to auscultation           OUTSIDE LABS:  CBC:   Lab Results   Component Value Date    WBC 6.8 02/25/2013    WBC 6.6 01/09/2006    HGB 14.4 02/25/2013    HGB 16.7 01/09/2006    HCT 41.3 02/25/2013    HCT 49.2 01/09/2006     02/25/2013     01/09/2006     BMP: No results found for: NA, POTASSIUM, CHLORIDE, CO2, BUN, CR, GLC  COAGS: No results found for: PTT, INR, FIBR  POC: No results found for: BGM, HCG, HCGS  HEPATIC: No results found for: ALBUMIN, PROTTOTAL, ALT, AST, GGT, ALKPHOS, BILITOTAL, BILIDIRECT, AMALIA  OTHER: No results found for: PH, LACT, A1C, AMEENA, PHOS, MAG, LIPASE, AMYLASE, TSH, T4, T3, CRP, SED    Anesthesia Plan    ASA Status:  2   NPO Status:  NPO Appropriate    Anesthesia Type: IV Regional Anesthesia.   Induction: Intravenous.   Maintenance: TIVA.        Consents    Anesthesia Plan(s) and associated risks, benefits, and realistic alternatives discussed. Questions answered and patient/representative(s) expressed understanding.     - Discussed: Risks, Benefits and Alternatives for BOTH SEDATION and the PROCEDURE were discussed     - Discussed with:  Patient      - Extended Intubation/Ventilatory Support Discussed: No.      - Patient is DNR/DNI Status: No    Use of blood products discussed: No .     Postoperative Care    Pain management: IV analgesics, Multi-modal analgesia.   PONV prophylaxis: Ondansetron (or other 5HT-3), Background Propofol Infusion     Comments:    Other Comments: The risks and benefits of anesthesia, and the alternatives where applicable, have been discussed with the patient, and they wish to proceed.            CANDY Keyes CRNA

## 2023-02-24 NOTE — OP NOTE
OPERATIVE REPORT    DATE OF SERVICE:  2023    PREOPERATIVE DIAGNOSIS  Right carpal tunnel syndrome     POSTOPERATIVE DIAGNOSIS  Right carpal tunnel syndrome    NAME OF OPERATION  1. Right carpal tunnel release     SURGEON  Guilherme Malone MD      SPECIMENS REMOVED: None    ESTIMATED BLOOD LOSS: Minimal    FINDINGS: Compressed median nerve in carpal tunnel    HISTORY  Barron Angel is a 43 year old male with severe right carpal tunnel syndrome.  He has positive EMG, positive Tinel.  He has failed conservative treatment, and presents now for right carpal tunnel release.  Risks, benefits, potential complications and alternatives were discussed.    SURGERY  After a smooth IV regional block, the patient's right arm was prepped and draped in sterile fashion.  A pause was performed for patient verification.   A longitudinal incision was made in the thenar palmar crease and carried down through subcutaneous tissue on the ulnar side of the palmaris longus fascia.  The transverse carpal ligament was identified and divided sharply.  Beneath this the median nerve was noted to be significantly narrowed.  Synovium was teased away from the nerve.  The nerve was now seen to be fully decompressed.  The wound was irrigated with saline, and subcutaneous tissue was injected with 0.25% Marcaine.  Skin edges were closed with interrupted 5-0 nylon suture.  Sterile dressings were applied.  A volar splint was applied.  The patient was taken to the recovery room in stable condition.    GUILHERME MALONE MD      MR#: [id number]   : 1980   ADMIT DATE: 2023

## 2023-02-24 NOTE — OR NURSING
Received report from Sharon Pineda OR RN to assume care of pt.  Then immediately gave report to Roxann SHOEMAKER RN.

## 2023-02-24 NOTE — ANESTHESIA CARE TRANSFER NOTE
Patient: Barron Angel    Procedure: Procedure(s):  RELEASE, CARPAL TUNNEL       Diagnosis: Carpal tunnel syndrome of right wrist [G56.01]  Diagnosis Additional Information: No value filed.    Anesthesia Type:   IV Regional Anesthesia     Note:    Oropharynx: oropharynx clear of all foreign objects and spontaneously breathing  Level of Consciousness: drowsy  Oxygen Supplementation: face mask    Independent Airway: airway patency satisfactory and stable  Dentition: dentition unchanged  Vital Signs Stable: post-procedure vital signs reviewed and stable  Report to RN Given: handoff report given  Patient transferred to: Phase II    Handoff Report: Identifed the Patient, Identified the Reponsible Provider, Reviewed the pertinent medical history, Discussed the surgical course, Reviewed Intra-OP anesthesia mangement and issues during anesthesia, Set expectations for post-procedure period and Allowed opportunity for questions and acknowledgement of understanding      Vitals:  Vitals Value Taken Time   /90 02/24/23 1220   Temp     Pulse 52 02/24/23 1220   Resp     SpO2 100 % 02/24/23 1225   Vitals shown include unvalidated device data.    Electronically Signed By: CANDY Keyes CRNA  February 24, 2023  12:26 PM

## 2023-02-24 NOTE — LETTER
February 24, 2023      To Whom It May Concern:      Barron MARY BETH LazcanoAngel had a surgical procedure today with Dr Mcnulty, 02/24/23.  He can not return to work until after his follow-up appointment on 3/7/23.      Sincerely,        Lily Bobby RN  Surgical Services

## 2023-02-24 NOTE — ANESTHESIA POSTPROCEDURE EVALUATION
Patient: Barron Angel    Procedure: Procedure(s):  RELEASE, CARPAL TUNNEL       Anesthesia Type:  IV Regional Anesthesia    Note:  Disposition: Outpatient   Postop Pain Control: Uneventful            Sign Out: Well controlled pain   PONV: No   Neuro/Psych: Uneventful            Sign Out: Acceptable/Baseline neuro status   Airway/Respiratory: Uneventful            Sign Out: Acceptable/Baseline resp. status   CV/Hemodynamics: Uneventful            Sign Out: Acceptable CV status   Other NRE: NONE   DID A NON-ROUTINE EVENT OCCUR? No    Event details/Postop Comments:  Pt was happy with anesthesia care.  No complications.  I will follow up with the pt if needed.           Last vitals:  Vitals Value Taken Time   /100 02/24/23 1250   Temp 98.2  F (36.8  C) 02/24/23 1220   Pulse 65 02/24/23 1250   Resp 18 02/24/23 1220   SpO2 100 % 02/24/23 1251   Vitals shown include unvalidated device data.    Electronically Signed By: CANDY Keyes CRNA  February 24, 2023  12:52 PM

## 2023-03-07 ENCOUNTER — OFFICE VISIT (OUTPATIENT)
Dept: ORTHOPEDICS | Facility: CLINIC | Age: 43
End: 2023-03-07
Payer: COMMERCIAL

## 2023-03-07 VITALS
BODY MASS INDEX: 20.81 KG/M2 | SYSTOLIC BLOOD PRESSURE: 119 MMHG | HEIGHT: 73 IN | WEIGHT: 157 LBS | HEART RATE: 65 BPM | DIASTOLIC BLOOD PRESSURE: 78 MMHG | RESPIRATION RATE: 18 BRPM

## 2023-03-07 DIAGNOSIS — Z98.890 S/P CARPAL TUNNEL RELEASE: ICD-10-CM

## 2023-03-07 DIAGNOSIS — G56.01 CARPAL TUNNEL SYNDROME ON RIGHT: Primary | ICD-10-CM

## 2023-03-07 PROCEDURE — 99024 POSTOP FOLLOW-UP VISIT: CPT | Performed by: ORTHOPAEDIC SURGERY

## 2023-03-07 NOTE — PATIENT INSTRUCTIONS
Start scar massage with vitamin-E cream.  Use night splint for 4 weeks.    Resume activity as tolerated.  Return to clinic 4 weeks

## 2023-03-07 NOTE — LETTER
3/7/2023         RE: Barron Angel  [address] [facility] [address]        Dear Colleague,    Thank you for referring your patient, Barron Angel, to the [facility]. Please see a copy of my visit note below.    Follow up right carpal tunnel release on 2/24/23.  Splint is removed.  Wound is healing well.   Sutures are removed.  He  has minimal numbness of fingers.    Start scar massage with vitamin-E cream.  Use night splint for 4 weeks.    Resume activity as tolerated.  Return to clinic 4 weeks        Again, thank you for allowing me to participate in the care of your patient.        Sincerely,        Guilherme Mcnulty MD

## 2023-03-07 NOTE — PROGRESS NOTES
Follow up right carpal tunnel release on 2/24/23.  Splint is removed.  Wound is healing well.   Sutures are removed.  He  has minimal numbness of fingers.    Start scar massage with vitamin-E cream.  Use night splint for 4 weeks.    Resume activity as tolerated.  Return to clinic 4 weeks

## 2024-05-31 ENCOUNTER — TELEPHONE (OUTPATIENT)
Dept: FAMILY MEDICINE | Facility: CLINIC | Age: 44
End: 2024-05-31

## 2024-05-31 ENCOUNTER — OFFICE VISIT (OUTPATIENT)
Dept: FAMILY MEDICINE | Facility: CLINIC | Age: 44
End: 2024-05-31

## 2024-05-31 VITALS
OXYGEN SATURATION: 99 % | SYSTOLIC BLOOD PRESSURE: 124 MMHG | WEIGHT: 151.25 LBS | DIASTOLIC BLOOD PRESSURE: 72 MMHG | TEMPERATURE: 97.2 F | HEIGHT: 73 IN | RESPIRATION RATE: 18 BRPM | BODY MASS INDEX: 20.05 KG/M2 | HEART RATE: 72 BPM

## 2024-05-31 DIAGNOSIS — R53.83 OTHER FATIGUE: ICD-10-CM

## 2024-05-31 DIAGNOSIS — I95.9 HYPOTENSION, UNSPECIFIED HYPOTENSION TYPE: ICD-10-CM

## 2024-05-31 DIAGNOSIS — R42 LIGHTHEADEDNESS: Primary | ICD-10-CM

## 2024-05-31 LAB
ANION GAP SERPL CALCULATED.3IONS-SCNC: 9 MMOL/L (ref 7–15)
BUN SERPL-MCNC: 11.6 MG/DL (ref 6–20)
CALCIUM SERPL-MCNC: 9.4 MG/DL (ref 8.6–10)
CHLORIDE SERPL-SCNC: 102 MMOL/L (ref 98–107)
CREAT SERPL-MCNC: 0.9 MG/DL (ref 0.67–1.17)
DEPRECATED HCO3 PLAS-SCNC: 30 MMOL/L (ref 22–29)
EGFRCR SERPLBLD CKD-EPI 2021: >90 ML/MIN/1.73M2
ERYTHROCYTE [DISTWIDTH] IN BLOOD BY AUTOMATED COUNT: 13.2 % (ref 10–15)
FERRITIN SERPL-MCNC: 74 NG/ML (ref 31–409)
FOLATE SERPL-MCNC: 6.2 NG/ML (ref 4.6–34.8)
GLUCOSE SERPL-MCNC: 84 MG/DL (ref 70–99)
HCT VFR BLD AUTO: 45.8 % (ref 40–53)
HGB BLD-MCNC: 15.2 G/DL (ref 13.3–17.7)
IRON BINDING CAPACITY (ROCHE): 290 UG/DL (ref 240–430)
IRON SATN MFR SERPL: 23 % (ref 15–46)
IRON SERPL-MCNC: 67 UG/DL (ref 61–157)
MCH RBC QN AUTO: 29.8 PG (ref 26.5–33)
MCHC RBC AUTO-ENTMCNC: 33.2 G/DL (ref 31.5–36.5)
MCV RBC AUTO: 90 FL (ref 78–100)
PLATELET # BLD AUTO: 315 10E3/UL (ref 150–450)
POTASSIUM SERPL-SCNC: 4.3 MMOL/L (ref 3.4–5.3)
RBC # BLD AUTO: 5.1 10E6/UL (ref 4.4–5.9)
SODIUM SERPL-SCNC: 141 MMOL/L (ref 135–145)
TSH SERPL DL<=0.005 MIU/L-ACNC: 1.17 UIU/ML (ref 0.3–4.2)
WBC # BLD AUTO: 5.8 10E3/UL (ref 4–11)

## 2024-05-31 PROCEDURE — 83550 IRON BINDING TEST: CPT

## 2024-05-31 PROCEDURE — 99214 OFFICE O/P EST MOD 30 MIN: CPT

## 2024-05-31 PROCEDURE — 36415 COLL VENOUS BLD VENIPUNCTURE: CPT

## 2024-05-31 PROCEDURE — 82728 ASSAY OF FERRITIN: CPT

## 2024-05-31 PROCEDURE — 80048 BASIC METABOLIC PNL TOTAL CA: CPT

## 2024-05-31 PROCEDURE — 82746 ASSAY OF FOLIC ACID SERUM: CPT

## 2024-05-31 PROCEDURE — 84443 ASSAY THYROID STIM HORMONE: CPT

## 2024-05-31 PROCEDURE — 93000 ELECTROCARDIOGRAM COMPLETE: CPT

## 2024-05-31 PROCEDURE — 83540 ASSAY OF IRON: CPT

## 2024-05-31 PROCEDURE — 85027 COMPLETE CBC AUTOMATED: CPT

## 2024-05-31 RX ORDER — SODIUM CHLORIDE 1 G/1
1 TABLET ORAL 3 TIMES DAILY
Qty: 90 TABLET | Refills: 0 | Status: SHIPPED | OUTPATIENT
Start: 2024-05-31 | End: 2024-06-30

## 2024-05-31 ASSESSMENT — PAIN SCALES - GENERAL: PAINLEVEL: NO PAIN (0)

## 2024-05-31 ASSESSMENT — PATIENT HEALTH QUESTIONNAIRE - PHQ9
SUM OF ALL RESPONSES TO PHQ QUESTIONS 1-9: 7
10. IF YOU CHECKED OFF ANY PROBLEMS, HOW DIFFICULT HAVE THESE PROBLEMS MADE IT FOR YOU TO DO YOUR WORK, TAKE CARE OF THINGS AT HOME, OR GET ALONG WITH OTHER PEOPLE: NOT DIFFICULT AT ALL
SUM OF ALL RESPONSES TO PHQ QUESTIONS 1-9: 7

## 2024-05-31 ASSESSMENT — ENCOUNTER SYMPTOMS: DIZZINESS: 1

## 2024-05-31 NOTE — PROGRESS NOTES
Assessment & Plan     Lightheadedness  - Folate; Future  - CBC with platelets; Future  - TSH; Future  - Basic metabolic panel; Future  - Ferritin; Future  - Iron and iron binding capacity; Future  - EKG 12-lead complete w/read - Clinics  - sodium chloride 1 GM tablet; Take 1 tablet (1 g) by mouth 3 times daily for 30 days  - Folate  - CBC with platelets  - TSH  - Basic metabolic panel  - Ferritin  - Iron and iron binding capacity    Other fatigue  - Folate; Future  - CBC with platelets; Future  - TSH; Future  - Basic metabolic panel; Future  - Ferritin; Future  - Iron and iron binding capacity; Future  - EKG 12-lead complete w/read - Clinics  - Folate  - CBC with platelets  - TSH  - Basic metabolic panel  - Ferritin  - Iron and iron binding capacity    Hypotension, unspecified hypotension type      I spent a total of 26 minutes on the day of the visit.   Time spent by me doing chart review, history and exam, documentation and further activities per the note          Nicotine/Tobacco Cessation  He reports that he has been smoking cigarettes. He has a 10 pack-year smoking history. He has quit using smokeless tobacco.  Nicotine/Tobacco Cessation Plan  Not Discussed        See Patient Instructions  Patient Instructions   Lightheadedness:    Start taking salt tablets 1 g 3 times daily    Recommend further evaluation today with labs and EKG    Notify the clinic if you are consistently having blood pressure less than 120/80 or consistently more than 140/90.  Monitor for symptoms of lightheadedness and dizziness and follow-up sooner if needed.    Take blood pressure twice a day, keep a log and follow-up in 1 week.  Bring your blood pressure cuff with you to next appointment to check for accuracy.  Increase salt in the diet.    Drink more than 70 oz of water daily unless you are on a fluid restriction for medical reasons    Wear Compression socks daily     Lightheadedness or Faintness: Care  "Instructions  Overview  Lightheadedness is a feeling that you are about to faint or \"pass out.\" You do not feel as if you or your surroundings are moving. It is different from vertigo, which is the feeling that you or things around you are spinning or tilting.  Lightheadedness usually goes away or gets better when you lie down. If lightheadedness gets worse, it can lead to a fainting spell.  It is common to feel lightheaded from time to time. It may be caused by many things. These include allergies, dehydration, illness, and medicines. Lightheadedness usually is not caused by a serious problem. It often is caused by a short-lasting drop in blood pressure and blood flow to your head that occurs when you get up too quickly from a seated or lying position.  Follow-up care is a key part of your treatment and safety. Be sure to make and go to all appointments, and call your doctor if you are having problems. It's also a good idea to know your test results and keep a list of the medicines you take.  How can you care for yourself at home?  Lie down for 1 or 2 minutes when you feel lightheaded. After lying down, sit up slowly and remain sitting for 1 to 2 minutes before slowly standing up.  Avoid movements, positions, or activities that have made you lightheaded in the past.  Get plenty of rest, especially if you have a cold or flu, which can cause lightheadedness.  Make sure you drink plenty of fluids, especially if you have a fever or have been sweating.  Do not drive or put yourself and others in danger while you feel lightheaded.  When should you call for help?   Call 911 anytime you think you may need emergency care. For example, call if:    You passed out (lost consciousness).     You have symptoms of a stroke. These may include:  Sudden numbness, tingling, weakness, or loss of movement in your face, arm, or leg, especially on only one side of your body.  Sudden vision changes.  Sudden trouble speaking.  Sudden " confusion or trouble understanding simple statements.  Sudden problems with walking or balance.  A sudden, severe headache that is different from past headaches.     You have symptoms of a heart attack. These may include:  Chest pain or pressure, or a strange feeling in the chest.  Sweating.  Shortness of breath.  Nausea or vomiting.  Pain, pressure, or a strange feeling in the back, neck, jaw, or upper belly or in one or both shoulders or arms.  Lightheadedness or sudden weakness.  A fast or irregular heartbeat.  After you call 911, the  may tell you to chew 1 adult-strength or 2 to 4 low-dose aspirin. Wait for an ambulance. Do not try to drive yourself.   Watch closely for changes in your health, and be sure to contact your doctor if:    Your lightheadedness gets worse or does not get better with home care.         Patient understood and verbally consented to the treatment plan. Discussed symptoms that would warrant an urgent or emergent visit. All of the patients' questions were answered. Patient was instructed to contact the clinic if questions or concerns arise. Recommend follow up appointments if symptoms worsen or fail to improve. Recommend follow up as needed. Recommend ER in the case of an emergency.    Lizeth Chang PA-C    Please note: Voice recognition software may have been used in preparing this note, unintended word substitutions may be present.        Butch Mart is a 44 year old, presenting for the following health issues:  Dizziness (Light headed per pt started 1 week ago. )        5/31/2024    10:52 AM   Additional Questions   Roomed by Bessie     History of Present Illness       Reason for visit:  Dizzy and light head  Symptom onset:  1-2 weeks ago  Symptoms include:  Dizzy light head  Symptom intensity:  Moderate  Symptom progression:  Worsening  Had these symptoms before:  No  What makes it worse:  No  What makes it better:  No    He eats 2-3 servings of fruits and  "vegetables daily.He consumes 3 sweetened beverage(s) daily.He exercises with enough effort to increase his heart rate 60 or more minutes per day.  He exercises with enough effort to increase his heart rate 5 days per week.   He is taking medications regularly.     Patient presents with 2 weeks of intermittent lightheadedness.  He does not go to the doctor often.  He does not check his blood pressure.  Reports that he stays well-hydrated.  He was concerned about low blood sugar so he started eating candy when he was feeling lightheaded and it did not help at all.  He eats 3 meals a day and also snacks throughout the day.  If he stands up too quickly he becomes lightheaded.  When he stands up he feels like he is going to fall over and feels unsteady.  He reports feeling like he cannot walk in a straight line.  He has not had any triggers or head trauma.  Reports that intermittently episodes last about 10 minutes and then resolved.  No blood loss, no blood in the urine or the stool.  No history of thyroid problems or personal history of diabetes.  He has family history of diabetes on both sides.  He does drink about 5-6 alcoholic beverages a week.              Review of Systems  Constitutional, HEENT, cardiovascular, pulmonary, GI, , musculoskeletal, neuro, skin, endocrine and psych systems are negative, except as otherwise noted.      Objective    /72   Pulse 72   Temp 97.2  F (36.2  C) (Temporal)   Resp 18   Ht 1.855 m (6' 1.03\")   Wt 68.6 kg (151 lb 4 oz)   SpO2 99%   BMI 19.94 kg/m    Body mass index is 19.94 kg/m .  Physical Exam   GENERAL: alert and no distress  EYES: Eyes grossly normal to inspection, PERRL and conjunctivae and sclerae normal  RESP: lungs clear to auscultation - no rales, rhonchi or wheezes  CV: regular rate and rhythm, normal S1 S2, no S3 or S4, no murmur, click or rub, no peripheral edema  SKIN: no suspicious lesions or rashes  NEURO: Normal strength and tone, mentation intact " "and speech normal  PSYCH: mentation appears normal, affect normal/bright    Office Visit on 01/12/2017   Component Date Value Ref Range Status    Copath Report 01/12/2017    Final                    Value:Patient Name: NOEMÍ DANG  MR#: [id number]  Specimen #: [id number]  Collected: 1/12/2017  Received: 1/13/2017  Reported: 1/16/2017 08:40  Ordering Phy(s): JEANNINE DREW MAI    For improved result formatting, select 'View Enhanced Report Format'  under Linked Documents section.    SPECIMEN(S):  A: Vas deferens, right  B: Vas deferens, left    FINAL DIAGNOSIS:  A, B.  Vas deferens, right, left, bilateral vasectomy:  - Bilateral vasa differentia with no pathologic changes  - Complete cross sections visualized bilaterally.    Electronically signed out by:    Eliot Andrew M.D., PhD    CLINICAL HISTORY:  36 year old male.    GROSS:  A. The specimen is received in formalin, labeled with the patient's name  and date of birth, and designated \"right vas deferens\". It consists of a  single tan tissue core measuring 1.0 cm in length and 0.2 cm in  diameter.  Entirely submitted in one cassette.    B. The specimen is received in formalin, labeled with the patient's name  and date of birth, and desig                          nated \"left vas deferens\". It consists of a  single tan tissue core measuring 0.8 cm in length and 0.2 cm in  diameter.  The specimen is inked blue as an identification marker and to  assist in orientation.  Entirely submitted in one cassette. (Dictated  by: Nelida WESLEY 1/13/2017 02:11 PM)    MICROSCOPIC:  Microscopic examination is performed.    CPT Codes:  A: 78209-KS0  B: 79047-AQ9    TESTING LAB LOCATION:  [address] [address] [phone]    COLLECTION SITE:  Client: [facility]  Location: [facility] (P)       Results for orders placed or performed in visit on 05/31/24   CBC with platelets     Status: " Normal   Result Value Ref Range    WBC Count 5.8 4.0 - 11.0 10e3/uL    RBC Count 5.10 4.40 - 5.90 10e6/uL    Hemoglobin 15.2 13.3 - 17.7 g/dL    Hematocrit 45.8 40.0 - 53.0 %    MCV 90 78 - 100 fL    MCH 29.8 26.5 - 33.0 pg    MCHC 33.2 31.5 - 36.5 g/dL    RDW 13.2 10.0 - 15.0 %    Platelet Count 315 150 - 450 10e3/uL     No results found.        Signed Electronically by: Lizeth Chang PA-C

## 2024-05-31 NOTE — LETTER
May 31, 2024      Barron Angel  [address] [place] [id number]        To Whom It May Concern:    Barron Angel  was seen on 5/31/24.  Please excuse him  until 6/1/24 due to an appointment.        Sincerely,        Lizeth Chang PA-C

## 2024-05-31 NOTE — TELEPHONE ENCOUNTER
----- Message from Lizeth Chang PA-C sent at 5/31/2024 12:37 PM CDT -----  Team - please call patient with results. Thank you!    Normal labs for: Electrolytes, kidney function, blood sugar, thyroid hormone, ferritin, and iron.      Lizeth Chang PA-C  [facility] Pool  Team - please call patient with results. Thank you!    Your complete blood count is normal, there is no evidence of anemia

## 2024-05-31 NOTE — PATIENT INSTRUCTIONS
"Lightheadedness:    Start taking salt tablets 1 g 3 times daily    Recommend further evaluation today with labs and EKG    Notify the clinic if you are consistently having blood pressure less than 120/80 or consistently more than 140/90.  Monitor for symptoms of lightheadedness and dizziness and follow-up sooner if needed.    Take blood pressure twice a day, keep a log and follow-up in 1 week.  Bring your blood pressure cuff with you to next appointment to check for accuracy.  Increase salt in the diet.    Drink more than 70 oz of water daily unless you are on a fluid restriction for medical reasons    Wear Compression socks daily     Lightheadedness or Faintness: Care Instructions  Overview  Lightheadedness is a feeling that you are about to faint or \"pass out.\" You do not feel as if you or your surroundings are moving. It is different from vertigo, which is the feeling that you or things around you are spinning or tilting.  Lightheadedness usually goes away or gets better when you lie down. If lightheadedness gets worse, it can lead to a fainting spell.  It is common to feel lightheaded from time to time. It may be caused by many things. These include allergies, dehydration, illness, and medicines. Lightheadedness usually is not caused by a serious problem. It often is caused by a short-lasting drop in blood pressure and blood flow to your head that occurs when you get up too quickly from a seated or lying position.  Follow-up care is a key part of your treatment and safety. Be sure to make and go to all appointments, and call your doctor if you are having problems. It's also a good idea to know your test results and keep a list of the medicines you take.  How can you care for yourself at home?  Lie down for 1 or 2 minutes when you feel lightheaded. After lying down, sit up slowly and remain sitting for 1 to 2 minutes before slowly standing up.  Avoid movements, positions, or activities that have made you " lightheaded in the past.  Get plenty of rest, especially if you have a cold or flu, which can cause lightheadedness.  Make sure you drink plenty of fluids, especially if you have a fever or have been sweating.  Do not drive or put yourself and others in danger while you feel lightheaded.  When should you call for help?   Call 911 anytime you think you may need emergency care. For example, call if:    You passed out (lost consciousness).     You have symptoms of a stroke. These may include:  Sudden numbness, tingling, weakness, or loss of movement in your face, arm, or leg, especially on only one side of your body.  Sudden vision changes.  Sudden trouble speaking.  Sudden confusion or trouble understanding simple statements.  Sudden problems with walking or balance.  A sudden, severe headache that is different from past headaches.     You have symptoms of a heart attack. These may include:  Chest pain or pressure, or a strange feeling in the chest.  Sweating.  Shortness of breath.  Nausea or vomiting.  Pain, pressure, or a strange feeling in the back, neck, jaw, or upper belly or in one or both shoulders or arms.  Lightheadedness or sudden weakness.  A fast or irregular heartbeat.  After you call 911, the  may tell you to chew 1 adult-strength or 2 to 4 low-dose aspirin. Wait for an ambulance. Do not try to drive yourself.   Watch closely for changes in your health, and be sure to contact your doctor if:    Your lightheadedness gets worse or does not get better with home care.         Patient understood and verbally consented to the treatment plan. Discussed symptoms that would warrant an urgent or emergent visit. All of the patients' questions were answered. Patient was instructed to contact the clinic if questions or concerns arise. Recommend follow up appointments if symptoms worsen or fail to improve. Recommend follow up as needed. Recommend ER in the case of an emergency.    Lizeth Chang,  PA-C    Please note: Voice recognition software may have been used in preparing this note, unintended word substitutions may be present.

## 2024-06-03 NOTE — RESULT ENCOUNTER NOTE
Hello -    Here are my comments about the recent results.  Your folic acid level is normal.    Normal labs for: Electrolytes, kidney function, blood sugar, thyroid hormone, ferritin, and iron.    Please let us know if you have any questions or concerns.    Regards,  Lizeth Chang PA-C

## 2024-06-05 ENCOUNTER — MYC MEDICAL ADVICE (OUTPATIENT)
Dept: FAMILY MEDICINE | Facility: CLINIC | Age: 44
End: 2024-06-05

## 2024-06-05 NOTE — TELEPHONE ENCOUNTER
Patient is calling back.  Informed patient of documentation as stated below per JANAY Gonzales.  Patient stated understanding.    Patient stated at his office visit with JANAY Gonzales, he was asked to start taking his blood pressure readings approximately 3 times a day.  Patient stated his BP has been running 140-180/ 60-90.  He stated in the morning his BP is higher.  He verbalized he is still having intermittent dizziness and lightheadedness.    Will forward to JANAY Gonzales for review.    Kristal Ann RN

## 2024-06-05 NOTE — LETTER
6/5/2024      Barron Angel  [address] [facility] [address]            Barron,     We have attempted to reach you by telephone but we were unsuccessful. Here is your lab results-         Normal labs for: Electrolytes, kidney function, blood sugar, thyroid hormone, ferritin, and iron.      Let us know if you have any questions or concerns.      Thank you   [facility] Team  [phone]

## 2024-06-06 NOTE — TELEPHONE ENCOUNTER
Called patient and notified him of the below response from provider. He stated understanding, and states he will check his schedule and call back to schedule a follow-up appointment.     GIRMA Turner, RN        Lizeth Chang PA-C  [facility] Pool8 hours ago (6:47 AM)     Please have him schedule a follow-up appointment with me or his primary care provider to further discuss.  I recommend urgent care or ER if he has any new or worsening symptoms.

## 2024-06-23 ENCOUNTER — HEALTH MAINTENANCE LETTER (OUTPATIENT)
Age: 44
End: 2024-06-23

## 2024-07-14 ENCOUNTER — HOSPITAL ENCOUNTER (EMERGENCY)
Facility: CLINIC | Age: 44
Discharge: HOME | End: 2024-07-14
Attending: STUDENT IN AN ORGANIZED HEALTH CARE EDUCATION/TRAINING PROGRAM | Admitting: STUDENT IN AN ORGANIZED HEALTH CARE EDUCATION/TRAINING PROGRAM

## 2024-07-14 VITALS
OXYGEN SATURATION: 99 % | HEART RATE: 74 BPM | RESPIRATION RATE: 20 BRPM | SYSTOLIC BLOOD PRESSURE: 141 MMHG | DIASTOLIC BLOOD PRESSURE: 90 MMHG

## 2024-07-14 DIAGNOSIS — K04.7 DENTAL INFECTION: ICD-10-CM

## 2024-07-14 PROCEDURE — 99284 EMERGENCY DEPT VISIT MOD MDM: CPT | Performed by: STUDENT IN AN ORGANIZED HEALTH CARE EDUCATION/TRAINING PROGRAM

## 2024-07-14 PROCEDURE — 99283 EMERGENCY DEPT VISIT LOW MDM: CPT | Performed by: STUDENT IN AN ORGANIZED HEALTH CARE EDUCATION/TRAINING PROGRAM

## 2024-07-14 ASSESSMENT — COLUMBIA-SUICIDE SEVERITY RATING SCALE - C-SSRS
6. HAVE YOU EVER DONE ANYTHING, STARTED TO DO ANYTHING, OR PREPARED TO DO ANYTHING TO END YOUR LIFE?: NO
2. HAVE YOU ACTUALLY HAD ANY THOUGHTS OF KILLING YOURSELF IN THE PAST MONTH?: NO
1. IN THE PAST MONTH, HAVE YOU WISHED YOU WERE DEAD OR WISHED YOU COULD GO TO SLEEP AND NOT WAKE UP?: NO

## 2024-07-14 ASSESSMENT — ACTIVITIES OF DAILY LIVING (ADL): ADLS_ACUITY_SCORE: 36

## 2024-07-14 NOTE — Clinical Note
Barron Angel was seen and treated in our emergency department on 7/14/2024.  He may return to work on 07/16/2024.       If you have any questions or concerns, please don't hesitate to call.      Herbie Alegria MD

## 2024-07-14 NOTE — DISCHARGE INSTRUCTIONS
I do think you have a dental infection.  Take the antibiotics as instructed until entirely gone.  Use Tylenol/ibuprofen for discomfort.  Follow-up with your dentist as soon as possible.  Return to the emergency department sooner for any new or worsening symptoms.

## 2024-07-14 NOTE — ED PROVIDER NOTES
History     Chief Complaint   Patient presents with    Dental Pain     HPI  Barron Angel is a 44 year old male with no relevant medical history who presents for evaluation of dental pain.  Patient has had a fractured tooth in his right lower mouth for at least a year.  He developed pain a few days ago without obvious injury.  It radiates from the right lower mouth into his jaw and into the right ear as well.  It is painful to eat but he is still able to do so.  He is making arrangements to be seen by his dentist this week but is concerned about an infection.  He denies any fever, trismus, sore throat, pain with movements of the neck, other complaints.    Allergies:  Allergies   Allergen Reactions    No Known Allergies      Problem List:    Patient Active Problem List    Diagnosis Date Noted    S/P carpal tunnel release 03/07/2023     Priority: Medium    Carpal tunnel syndrome on right 02/17/2023     Priority: Medium    Bilateral carpal tunnel syndrome 04/23/2022     Priority: Medium    CARDIOVASCULAR SCREENING; LDL GOAL LESS THAN 160 10/31/2010     Priority: Medium      Past Medical History:    No past medical history on file.    Past Surgical History:    Past Surgical History:   Procedure Laterality Date    C TIBIA FX.BRACE;SEMIRIGID  2004    Fractured right tibia, fixator gregory    HC REMOVE FOREIGN BODY SIMPLE  01/10/06    Right hand    LAP VENTRAL HERNIA REPAIR  8/19/2010    open procedure    RELEASE CARPAL TUNNEL Right 2/24/2023    Procedure: RELEASE, CARPAL TUNNEL RIGHT;  Surgeon: Guilherme Mcnulty MD;  Location: [facility] OR     Family History:    Family History   Problem Relation Age of Onset    Diabetes Maternal Grandfather     Diabetes Paternal Grandfather      Social History:  Marital Status:   [2]  Social History     Tobacco Use    Smoking status: Every Day     Current packs/day: 0.50     Average packs/day: 0.5 packs/day for 20.0 years (10.0 ttl pk-yrs)     Types: Cigarettes    Smokeless tobacco:  Former    Tobacco comments:     started age 16   Vaping Use    Vaping status: Never Used   Substance Use Topics    Alcohol use: Yes     Alcohol/week: 0.0 standard drinks of alcohol     Comment: not very often    Drug use: No      Medications:    amoxicillin-clavulanate (AUGMENTIN) 875-125 MG tablet  HYDROcodone-acetaminophen (NORCO) 5-325 MG tablet  naproxen (NAPROSYN) 500 MG tablet      Review of Systems   All other systems reviewed and are negative.  See HPI.    Physical Exam   BP: (!) 141/90  Pulse: 74  Resp: 20  SpO2: 99 %    Physical Exam  Vitals and nursing note reviewed.   Constitutional:       General: He is not in acute distress.     Appearance: Normal appearance. He is not toxic-appearing.      Comments: Uncomfortable but nontoxic.   HENT:      Head: Normocephalic and atraumatic.      Right Ear: Tympanic membrane and ear canal normal.      Left Ear: Tympanic membrane and ear canal normal.      Mouth/Throat:      Mouth: Mucous membranes are moist.      Comments: Poor dentition.  There is a tooth in the right lower mouth that is fractured.  Almost the entire anterior half of the tooth is missing.  The tooth is tender to direct palpation.  No erythema or signs of abscess to the gumline.  Oropharynx otherwise unremarkable.  No signs of PTA or deep soft tissue infection.  Eyes:      General: No scleral icterus.     Conjunctiva/sclera: Conjunctivae normal.   Cardiovascular:      Rate and Rhythm: Normal rate and regular rhythm.      Pulses: Normal pulses.      Heart sounds: Normal heart sounds.   Pulmonary:      Effort: Pulmonary effort is normal. No respiratory distress.      Breath sounds: Normal breath sounds.   Abdominal:      Palpations: Abdomen is soft.      Tenderness: There is no abdominal tenderness.   Musculoskeletal:         General: No deformity. Normal range of motion.      Cervical back: Normal range of motion and neck supple. No rigidity or tenderness.   Skin:     General: Skin is warm.       Capillary Refill: Capillary refill takes less than 2 seconds.      Coloration: Skin is not pale.      Findings: No erythema.   Neurological:      General: No focal deficit present.      Mental Status: He is alert and oriented to person, place, and time.   Psychiatric:         Mood and Affect: Mood normal.       ED Course        Procedures            No results found for this or any previous visit (from the past 24 hour(s)).    Medications - No data to display    Assessments & Plan (with Medical Decision Making)     I have reviewed the nursing notes.    I have reviewed the findings, diagnosis, plan and need for follow up with the patient.  Medical Decision Making  Barron Angel is a 44 year old male with no relevant medical history who presents for evaluation of dental pain.  Mild hypertension, vitals otherwise normal.  He appears slightly uncomfortable but nontoxic.  Exam is most significant for a chronically fractured tooth in the right lower mouth which is tender to direct palpation.  I see no surrounding erythema or evidence of abscess to the gumline.  Oropharynx is otherwise unremarkable.  No trismus, voice changes, or pain on range of motion of the neck.  TMs clear.  He very likely has a developing gumline/dental infection.  Patient is already making arrangements to be seen by dentist later this week.  Will prescribe antibiotics until then.  Advised anti-inflammatory medications.  Patient will return to the emergency department sooner for any new/worsening symptoms.    Discharge Medication List as of 7/14/2024  7:29 AM        START taking these medications    Details   amoxicillin-clavulanate (AUGMENTIN) 875-125 MG tablet Take 1 tablet by mouth 2 times daily for 7 days, Disp-14 tablet, R-0, E-Prescribe           Final diagnoses:   Dental infection     7/14/2024   [facility] EMERGENCY DEPT       Herbie Alegria MD  07/14/24 [id number]

## 2024-07-14 NOTE — ED TRIAGE NOTES
Triage Assessment (Adult)       Row Name 07/14/24 0647          Triage Assessment    Airway WDL WDL        Respiratory WDL    Respiratory WDL WDL                   Pt states he has a painful and infected tooth that he would like antibiotics for.

## 2024-07-15 ENCOUNTER — PATIENT OUTREACH (OUTPATIENT)
Dept: FAMILY MEDICINE | Facility: CLINIC | Age: 44
End: 2024-07-15

## 2024-07-15 NOTE — TELEPHONE ENCOUNTER
Transitions of Care Outreach  Chief Complaint   Patient presents with    Hospital F/U       Most Recent Admission Date: 7/14/2024   Most Recent Admission Diagnosis:      Most Recent Discharge Date: 7/14/2024   Most Recent Discharge Diagnosis: Dental infection - K04.7     Transitions of Care Assessment    Discharge Assessment  How are you doing now that you are home?: worse  How are your symptoms? (Red Flag symptoms escalate to triage hotline per guidelines): Unchanged  Do you know how to contact your clinic care team if you have future questions or changes to your health status? : Yes  Does the patient have their discharge instructions? : Yes  Does the patient have questions regarding their discharge instructions? : Yes (see comment)  Were you started on any new medications or were there changes to any of your previous medications? : Yes  Does the patient have all of their medications?: Yes  Do you have questions regarding any of your medications? : Yes (see comment)  Do you have all of your needed medical supplies or equipment (DME)?  (i.e. oxygen tank, CPAP, cane, etc.): Yes    Follow up Plan     Discharge Follow-Up  Discharge follow up appointment scheduled in alignment with recommended follow up timeframe or Transitions of Risk Category? (Low = within 30 days; Moderate= within 14 days; High= within 7 days): Yes  Discharge Follow Up Appointment Date: 07/19/24  Discharge Follow Up Appointment Scheduled with?: Specialty Care Provider (dentist)    No future appointments with PCP, declined at this time. Patient following up with his dentist 7/15/24     Outpatient Plan as outlined on AVS reviewed with patient.    For any urgent concerns, please contact our 24 hour nurse triage line: [phone] ([phone])       Alma Crump RN

## 2024-07-15 NOTE — TELEPHONE ENCOUNTER
Patient Contact    Attempt # 1    RN did attempt to reach Patient. No answer. Message left for Patient to call the clinic back and ask to speak to a triage nurse.     Kristal Garcia RN on 7/15/2024 at 3:26 PM

## 2024-07-16 ENCOUNTER — TELEPHONE (OUTPATIENT)
Dept: FAMILY MEDICINE | Facility: CLINIC | Age: 44
End: 2024-07-16

## 2024-07-16 NOTE — TELEPHONE ENCOUNTER
Patient calling to see if he can get his work note extended. He states he needs it to be through today as he did not make it to work. Patient has not had follow up. Informed patient that PCP is out of office until next week and since he has not been seen in clinic for this, there is no one to ask to extend work note. Of note patient called earlier but did not go to Urgent care as recommended. He states he is now feeling better and his jaw is no longer swollen.   Patient expressed understanding.    Monik Lopez RN on 7/16/2024 at 3:08 PM

## 2024-07-16 NOTE — TELEPHONE ENCOUNTER
Patient states his esophagus is getting sensitive to the touch.    He still has swelling under his jaw.    He would like to be seen today.  No openings.    Patient advised to go to urgent care.  Malgorzata Sterling RN on 7/16/2024 at 7:41 AM

## 2025-07-12 ENCOUNTER — HEALTH MAINTENANCE LETTER (OUTPATIENT)
Age: 45
End: 2025-07-12

## (undated) DEVICE — GLOVE BIOGEL PI SZ 7.5 40875

## (undated) DEVICE — BASIN SET MINOR DISP

## (undated) DEVICE — GLOVE BIOGEL PI MICRO INDICATOR UNDERGLOVE SZ 8.0 48980

## (undated) DEVICE — BNDG ELASTIC 4"X5YDS UNSTERILE 6611-40

## (undated) DEVICE — CAST PLASTER SPLINT 4X15" EXTRA FAST

## (undated) DEVICE — CAST STOCKINETTE 3" COTTON 30-7003

## (undated) DEVICE — PACK HAND WRIST FOREARM CUSTOM

## (undated) DEVICE — ESU GROUND PAD UNIVERSAL W/O CORD

## (undated) DEVICE — DRSG GAUZE 4X4" 2187

## (undated) DEVICE — SU ETHILON 5-0 PS-2 18" 1666H

## (undated) DEVICE — SOL NACL 0.9% IRRIG 1000ML BOTTLE 07138-09

## (undated) RX ORDER — LIDOCAINE HYDROCHLORIDE 10 MG/ML
INJECTION, SOLUTION EPIDURAL; INFILTRATION; INTRACAUDAL; PERINEURAL
Status: DISPENSED
Start: 2023-02-24

## (undated) RX ORDER — FENTANYL CITRATE 50 UG/ML
INJECTION, SOLUTION INTRAMUSCULAR; INTRAVENOUS
Status: DISPENSED
Start: 2023-02-24

## (undated) RX ORDER — PROPOFOL 10 MG/ML
INJECTION, EMULSION INTRAVENOUS
Status: DISPENSED
Start: 2023-02-24

## (undated) RX ORDER — LIDOCAINE HYDROCHLORIDE 5 MG/ML
INJECTION, SOLUTION INFILTRATION; INTRAVENOUS
Status: DISPENSED
Start: 2023-02-24

## (undated) RX ORDER — BUPIVACAINE HYDROCHLORIDE 2.5 MG/ML
INJECTION, SOLUTION EPIDURAL; INFILTRATION; INTRACAUDAL
Status: DISPENSED
Start: 2023-02-24

## (undated) RX ORDER — ONDANSETRON 2 MG/ML
INJECTION INTRAMUSCULAR; INTRAVENOUS
Status: DISPENSED
Start: 2023-02-24